# Patient Record
Sex: MALE | URBAN - METROPOLITAN AREA
[De-identification: names, ages, dates, MRNs, and addresses within clinical notes are randomized per-mention and may not be internally consistent; named-entity substitution may affect disease eponyms.]

---

## 2021-06-02 ENCOUNTER — RECORDS - HEALTHEAST (OUTPATIENT)
Dept: ADMINISTRATIVE | Facility: CLINIC | Age: 46
End: 2021-06-02

## 2021-10-24 ENCOUNTER — APPOINTMENT (OUTPATIENT)
Dept: CT IMAGING | Facility: CLINIC | Age: 46
End: 2021-10-24
Payer: COMMERCIAL

## 2021-10-24 ENCOUNTER — HOSPITAL ENCOUNTER (EMERGENCY)
Facility: CLINIC | Age: 46
Discharge: HOME | End: 2021-10-24
Admitting: PHYSICIAN ASSISTANT
Payer: COMMERCIAL

## 2021-10-24 ENCOUNTER — APPOINTMENT (OUTPATIENT)
Dept: ULTRASOUND IMAGING | Facility: CLINIC | Age: 46
End: 2021-10-24
Payer: COMMERCIAL

## 2021-10-24 VITALS
DIASTOLIC BLOOD PRESSURE: 90 MMHG | HEIGHT: 70 IN | OXYGEN SATURATION: 100 % | TEMPERATURE: 96.9 F | SYSTOLIC BLOOD PRESSURE: 174 MMHG | WEIGHT: 165 LBS | HEART RATE: 68 BPM | RESPIRATION RATE: 24 BRPM | BODY MASS INDEX: 23.62 KG/M2

## 2021-10-24 DIAGNOSIS — R11.2 NON-INTRACTABLE VOMITING WITH NAUSEA, UNSPECIFIED VOMITING TYPE: ICD-10-CM

## 2021-10-24 DIAGNOSIS — R59.0 INTRA-ABDOMINAL LYMPHADENOPATHY: ICD-10-CM

## 2021-10-24 DIAGNOSIS — R10.84 ABDOMINAL PAIN, GENERALIZED: ICD-10-CM

## 2021-10-24 DIAGNOSIS — M54.6 ACUTE BILATERAL THORACIC BACK PAIN: ICD-10-CM

## 2021-10-24 LAB
ALBUMIN SERPL-MCNC: 4.1 G/DL (ref 3.5–5)
ALBUMIN UR-MCNC: NEGATIVE MG/DL
ALP SERPL-CCNC: 42 U/L (ref 45–120)
ALT SERPL W P-5'-P-CCNC: 18 U/L (ref 0–45)
ANION GAP SERPL CALCULATED.3IONS-SCNC: 15 MMOL/L (ref 5–18)
APPEARANCE UR: CLEAR
AST SERPL W P-5'-P-CCNC: 17 U/L (ref 0–40)
ATRIAL RATE - MUSE: 55 BPM
BASOPHILS # BLD AUTO: 0.1 10E3/UL (ref 0–0.2)
BASOPHILS NFR BLD AUTO: 1 %
BILIRUB SERPL-MCNC: 1.8 MG/DL (ref 0–1)
BILIRUB UR QL STRIP: NEGATIVE
BUN SERPL-MCNC: 22 MG/DL (ref 8–22)
CALCIUM SERPL-MCNC: 8.8 MG/DL (ref 8.5–10.5)
CHLORIDE BLD-SCNC: 102 MMOL/L (ref 98–107)
CO2 SERPL-SCNC: 18 MMOL/L (ref 22–31)
COLOR UR AUTO: COLORLESS
CREAT SERPL-MCNC: 0.86 MG/DL (ref 0.7–1.3)
DIASTOLIC BLOOD PRESSURE - MUSE: 87 MMHG
EOSINOPHIL # BLD AUTO: 0.1 10E3/UL (ref 0–0.7)
EOSINOPHIL NFR BLD AUTO: 1 %
ERYTHROCYTE [DISTWIDTH] IN BLOOD BY AUTOMATED COUNT: 12.6 % (ref 10–15)
GFR SERPL CREATININE-BSD FRML MDRD: >90 ML/MIN/1.73M2
GLUCOSE BLD-MCNC: 105 MG/DL (ref 70–125)
GLUCOSE UR STRIP-MCNC: NEGATIVE MG/DL
HCT VFR BLD AUTO: 41.7 % (ref 40–53)
HGB BLD-MCNC: 14.6 G/DL (ref 13.3–17.7)
HGB UR QL STRIP: NEGATIVE
IMM GRANULOCYTES # BLD: 0.1 10E3/UL
IMM GRANULOCYTES NFR BLD: 1 %
INTERPRETATION ECG - MUSE: NORMAL
KETONES UR STRIP-MCNC: 40 MG/DL
LACTATE SERPL-SCNC: 1.1 MMOL/L (ref 0.7–2)
LACTATE SERPL-SCNC: 3.5 MMOL/L (ref 0.7–2)
LEUKOCYTE ESTERASE UR QL STRIP: NEGATIVE
LIPASE SERPL-CCNC: 24 U/L (ref 0–52)
LYMPHOCYTES # BLD AUTO: 2.3 10E3/UL (ref 0.8–5.3)
LYMPHOCYTES NFR BLD AUTO: 19 %
MCH RBC QN AUTO: 31.6 PG (ref 26.5–33)
MCHC RBC AUTO-ENTMCNC: 35 G/DL (ref 31.5–36.5)
MCV RBC AUTO: 90 FL (ref 78–100)
MONOCYTES # BLD AUTO: 0.9 10E3/UL (ref 0–1.3)
MONOCYTES NFR BLD AUTO: 7 %
NEUTROPHILS # BLD AUTO: 9.1 10E3/UL (ref 1.6–8.3)
NEUTROPHILS NFR BLD AUTO: 71 %
NITRATE UR QL: NEGATIVE
NRBC # BLD AUTO: 0 10E3/UL
NRBC BLD AUTO-RTO: 0 /100
P AXIS - MUSE: 32 DEGREES
PH UR STRIP: 6 [PH] (ref 5–7)
PLATELET # BLD AUTO: 317 10E3/UL (ref 150–450)
POTASSIUM BLD-SCNC: 3.4 MMOL/L (ref 3.5–5)
PR INTERVAL - MUSE: 156 MS
PROT SERPL-MCNC: 6.7 G/DL (ref 6–8)
QRS DURATION - MUSE: 110 MS
QT - MUSE: 498 MS
QTC - MUSE: 476 MS
R AXIS - MUSE: -48 DEGREES
RBC # BLD AUTO: 4.62 10E6/UL (ref 4.4–5.9)
RBC URINE: 2 /HPF
SODIUM SERPL-SCNC: 135 MMOL/L (ref 136–145)
SP GR UR STRIP: >1.05 (ref 1–1.03)
SYSTOLIC BLOOD PRESSURE - MUSE: 158 MMHG
T AXIS - MUSE: 0 DEGREES
TROPONIN I SERPL-MCNC: <0.01 NG/ML (ref 0–0.29)
UROBILINOGEN UR STRIP-MCNC: <2 MG/DL
VENTRICULAR RATE- MUSE: 55 BPM
WBC # BLD AUTO: 12.5 10E3/UL (ref 4–11)
WBC URINE: <1 /HPF

## 2021-10-24 PROCEDURE — 250N000011 HC RX IP 250 OP 636: Performed by: PHYSICIAN ASSISTANT

## 2021-10-24 PROCEDURE — 96374 THER/PROPH/DIAG INJ IV PUSH: CPT | Mod: 59

## 2021-10-24 PROCEDURE — 99285 EMERGENCY DEPT VISIT HI MDM: CPT | Mod: 25

## 2021-10-24 PROCEDURE — 80053 COMPREHEN METABOLIC PANEL: CPT | Performed by: PHYSICIAN ASSISTANT

## 2021-10-24 PROCEDURE — 250N000011 HC RX IP 250 OP 636: Performed by: RADIOLOGY

## 2021-10-24 PROCEDURE — 84484 ASSAY OF TROPONIN QUANT: CPT | Performed by: PHYSICIAN ASSISTANT

## 2021-10-24 PROCEDURE — 81001 URINALYSIS AUTO W/SCOPE: CPT | Performed by: PHYSICIAN ASSISTANT

## 2021-10-24 PROCEDURE — 83605 ASSAY OF LACTIC ACID: CPT | Performed by: PHYSICIAN ASSISTANT

## 2021-10-24 PROCEDURE — 96361 HYDRATE IV INFUSION ADD-ON: CPT

## 2021-10-24 PROCEDURE — 36415 COLL VENOUS BLD VENIPUNCTURE: CPT | Performed by: PHYSICIAN ASSISTANT

## 2021-10-24 PROCEDURE — 83690 ASSAY OF LIPASE: CPT | Performed by: PHYSICIAN ASSISTANT

## 2021-10-24 PROCEDURE — 258N000003 HC RX IP 258 OP 636: Performed by: PHYSICIAN ASSISTANT

## 2021-10-24 PROCEDURE — 85025 COMPLETE CBC W/AUTO DIFF WBC: CPT | Performed by: PHYSICIAN ASSISTANT

## 2021-10-24 PROCEDURE — 74177 CT ABD & PELVIS W/CONTRAST: CPT

## 2021-10-24 PROCEDURE — 76705 ECHO EXAM OF ABDOMEN: CPT

## 2021-10-24 PROCEDURE — 93005 ELECTROCARDIOGRAM TRACING: CPT | Performed by: PHYSICIAN ASSISTANT

## 2021-10-24 PROCEDURE — 96375 TX/PRO/DX INJ NEW DRUG ADDON: CPT

## 2021-10-24 RX ORDER — MORPHINE SULFATE 4 MG/ML
4 INJECTION, SOLUTION INTRAMUSCULAR; INTRAVENOUS ONCE
Status: COMPLETED | OUTPATIENT
Start: 2021-10-24 | End: 2021-10-24

## 2021-10-24 RX ORDER — CYCLOBENZAPRINE HCL 10 MG
10 TABLET ORAL 2 TIMES DAILY PRN
Qty: 14 TABLET | Refills: 0 | Status: SHIPPED | OUTPATIENT
Start: 2021-10-24

## 2021-10-24 RX ORDER — IOPAMIDOL 755 MG/ML
100 INJECTION, SOLUTION INTRAVASCULAR ONCE
Status: COMPLETED | OUTPATIENT
Start: 2021-10-24 | End: 2021-10-24

## 2021-10-24 RX ORDER — ONDANSETRON 4 MG/1
4 TABLET, ORALLY DISINTEGRATING ORAL EVERY 8 HOURS PRN
Qty: 10 TABLET | Refills: 0 | Status: SHIPPED | OUTPATIENT
Start: 2021-10-24 | End: 2021-10-24

## 2021-10-24 RX ORDER — ONDANSETRON 2 MG/ML
4 INJECTION INTRAMUSCULAR; INTRAVENOUS ONCE
Status: COMPLETED | OUTPATIENT
Start: 2021-10-24 | End: 2021-10-24

## 2021-10-24 RX ORDER — ONDANSETRON 4 MG/1
4 TABLET, ORALLY DISINTEGRATING ORAL EVERY 8 HOURS PRN
Qty: 10 TABLET | Refills: 0 | Status: SHIPPED | OUTPATIENT
Start: 2021-10-24 | End: 2024-03-21

## 2021-10-24 RX ORDER — CYCLOBENZAPRINE HCL 10 MG
10 TABLET ORAL 2 TIMES DAILY PRN
Qty: 14 TABLET | Refills: 0 | Status: SHIPPED | OUTPATIENT
Start: 2021-10-24 | End: 2021-10-24

## 2021-10-24 RX ADMIN — ONDANSETRON 4 MG: 2 INJECTION INTRAMUSCULAR; INTRAVENOUS at 17:56

## 2021-10-24 RX ADMIN — HYDROMORPHONE HYDROCHLORIDE 1 MG: 1 INJECTION, SOLUTION INTRAMUSCULAR; INTRAVENOUS; SUBCUTANEOUS at 18:35

## 2021-10-24 RX ADMIN — MORPHINE SULFATE 4 MG: 4 INJECTION INTRAVENOUS at 17:56

## 2021-10-24 RX ADMIN — IOPAMIDOL 100 ML: 755 INJECTION, SOLUTION INTRAVENOUS at 18:50

## 2021-10-24 RX ADMIN — SODIUM CHLORIDE 1000 ML: 9 INJECTION, SOLUTION INTRAVENOUS at 17:56

## 2021-10-24 RX ADMIN — SODIUM CHLORIDE 1000 ML: 9 INJECTION, SOLUTION INTRAVENOUS at 19:45

## 2021-10-24 ASSESSMENT — ENCOUNTER SYMPTOMS
BACK PAIN: 1
NERVOUS/ANXIOUS: 1
NAUSEA: 1
DIAPHORESIS: 1
ABDOMINAL PAIN: 1
DIARRHEA: 0
FEVER: 0
VOMITING: 1
SHORTNESS OF BREATH: 1

## 2021-10-24 ASSESSMENT — MIFFLIN-ST. JEOR: SCORE: 1629.69

## 2021-10-24 NOTE — ED PROVIDER NOTES
EMERGENCY DEPARTMENT ENCOUNTER      NAME: Luiz Herbert  AGE: 46 year old male  YOB: 1975  MRN: [id number]  EVALUATION DATE & TIME: 10/24/2021  5:44 PM    PCP: No primary care provider on file.    ED PROVIDER: Stefanie Hernandez PA-C      Chief Complaint   Patient presents with     Back Pain         FINAL IMPRESSION:  1. Acute bilateral thoracic back pain    2. Non-intractable vomiting with nausea, unspecified vomiting type    3. Abdominal pain, generalized    4. Intra-abdominal lymphadenopathy          MEDICAL DECISION MAKING:    Pertinent Labs & Imaging studies reviewed. (See chart for details)  46 year old male with a pertinent history of HLD, anemia, anxiety, lower back pain, heartburn presents to the Emergency Department for evaluation of sudden onset of mid back pain which began around 0930 this morning. Atraumatic. Shortly after, he developed nausea with several episodes of vomiting and dry heaving. Reports onset of abdominal pain after emesis. Denies chest pain, fevers, hematemesis, urinary symptoms.     Vitals reviewed and notable for elevated blood pressure. On physical exam he is uncomfortable appearing, diaphoretic and holding emesis bag. He has diffuse abdominal tenderness with no focality. No rebound or guarding. No flank or CVA tenderness. Lungs CTAB. Normal S1/S2. Symmetric peripheral pulses. No reproducible back tenderness. No midline spinal tenderness. 5/5 strength of bilateral lower extremities. Normal sensation. No bowel/bladder dysfunction reported. No saddle anesthesia. Differential diagnosis includes but not limited to atypical presentation for ACS, AAA dissection, mesenteric ischemia, colitis/diverticulitis, appendicitis, pyelonephritis, ureterolithiasis, pancreatitis, hepatobiliary etiology, PUD, gastritis, referred back pain.      Patient feeling significantly better after zofran and two doses of IV pain medication. CT abd pelvis with no significant findings to explain the  patient's pain. Single 10 mm right retroperitoneal lymph node. Most likely reactive. Recommend follow-up CT in 3 months to ensure that this resolves. Right testicle noted to be in the lower inguinal canal. Transient finding as on exam after he returned from imaging, both testicles present in scrotum, normal  exam. He denies any testicular pain. Lactic acid minimally elevated at 3.5 which normalized after IVF. Nothing on CT to suggest mesenteric ischemia and patient is now comfortable, denying abdominal tenderness. White count slightly elevated at 12.5. Suspect both are secondary to stress reaction from pain and vomiting. UA without evidence of infection and no RBCs to suggest recently passed kidney stone. No CT findings of kidney stone or hydronephrosis. Total bilirubin 1.8, otherwise remaining LFTs are WNL. RUQ US normal. EKG without ischemic changes and troponin not elevated, low suspicion for atypical ACS. Low risk heart score. Unclear etiology for his symptoms. Possible food poisoning/gastritis. He has no reproducible back tenderness. Clinically, nothing to suggest spinal cord compromise, epidural bleed or abscess, osteomyelitis, discitis, fracture or dislocation. He is tolerating PO. Repeat abdominal exam benign with very low suspicion for acute or surgical abdomen. Patient feels comfortable discharging home. Will send home with prescription for flexeril for his back pain and zofran for nausea/vomiting. Discussed return precautions, close follow up with PCP and patient discharged home in stable condition.     ED COURSE:  5:52 PM I met with the patient, obtained history, performed an initial exam, and discussed options and plan for diagnostics and treatment here in the ED.  7:43 PM  exam completed with chaperone after radiology reported his right testicle ascended into the inguinal canal during the CT.   10:03 PM I rechecked the patient and updated him with results. Patient discharged after being provided  with extensive anticipatory guidance and given return precautions, importance of PCP follow-up emphasized.    At the conclusion of the encounter I discussed the results of all of the tests and the disposition. The questions were answered. The patient acknowledged understanding and was agreeable with the care plan.     PPE worn: surgical mask.    MEDICATIONS GIVEN IN THE EMERGENCY:  Medications   morphine (PF) injection 4 mg (4 mg Intravenous Given 10/24/21 1756)   ondansetron (ZOFRAN) injection 4 mg (4 mg Intravenous Given 10/24/21 1756)   0.9% sodium chloride BOLUS (0 mLs Intravenous Stopped 10/24/21 1800)   HYDROmorphone (DILAUDID) injection 1 mg (1 mg Intravenous Given 10/24/21 1835)   iopamidol (ISOVUE-370) solution 100 mL (100 mLs Intravenous Given 10/24/21 1850)   0.9% sodium chloride BOLUS (0 mLs Intravenous Stopped 10/24/21 2215)       NEW PRESCRIPTIONS STARTED AT TODAY'S ER VISIT  Discharge Medication List as of 10/24/2021 10:15 PM      START taking these medications    Details   cyclobenzaprine (FLEXERIL) 10 MG tablet Take 1 tablet (10 mg) by mouth 2 times daily as needed for muscle spasms, Disp-14 tablet, R-0, E-Prescribe      ondansetron (ZOFRAN-ODT) 4 MG ODT tab Take 1 tablet (4 mg) by mouth every 8 hours as needed for nausea, Disp-10 tablet, R-0, E-Prescribe                  =================================================================    HPI:    Patient information was obtained from: patient    Use of Interpretor: N/A       Luiz Herbert is a 46 year old male with a pertinent history of HLD, anemia, anxiety, lower back pain, heartburn who presents to this ED via walk-in for evaluation of back pain, abdominal pain, and nausea with vomiting.    Patient reports sudden onset of mid back pain which began around 0930 this morning. Atraumatic. Shortly after, he developed nausea with several episodes of vomiting and dry heaving. Reports onset of abdominal pain after emesis. His pain is severe and  constant. No radiation into his chest. Associating diaphoresis, shortness of breath, and anxiety, secondary to pain. Patient denies diarrhea, fevers, urinary symptoms, hematemesis. No history of abdominal surgeries or kidney stones. Only prescriptions medication he is on is lexapro. No other complaints or concerns expressed at this time.    REVIEW OF SYSTEMS:  Review of Systems   Constitutional: Positive for diaphoresis. Negative for fever.   Respiratory: Positive for shortness of breath. Negative for cough.    Cardiovascular: Negative for chest pain and palpitations.   Gastrointestinal: Positive for abdominal pain, nausea and vomiting. Negative for blood in stool and diarrhea.   Genitourinary: Negative for dysuria, flank pain, frequency, hematuria and testicular pain.   Musculoskeletal: Positive for back pain. Negative for gait problem.   Neurological: Negative for dizziness, light-headedness and headaches.   Psychiatric/Behavioral: The patient is nervous/anxious.    All other systems reviewed and are negative.    PAST MEDICAL HISTORY:  Past Medical History:   Diagnosis Date     Anemia      Anxiety      Depressive disorder      Essential familial hypercholesterolemia        PAST SURGICAL HISTORY:  History reviewed. No pertinent surgical history.        CURRENT MEDICATIONS:    No current facility-administered medications for this encounter.    Current Outpatient Medications:      cyclobenzaprine (FLEXERIL) 10 MG tablet, Take 1 tablet (10 mg) by mouth 2 times daily as needed for muscle spasms, Disp: 14 tablet, Rfl: 0     ondansetron (ZOFRAN-ODT) 4 MG ODT tab, Take 1 tablet (4 mg) by mouth every 8 hours as needed for nausea, Disp: 10 tablet, Rfl: 0     escitalopram oxalate (LEXAPRO) 10 MG tablet, [ESCITALOPRAM OXALATE (LEXAPRO) 10 MG TABLET] TAKE 1 TABLET BY MOUTH DAILY, Disp: 90 tablet, Rfl: 0      ALLERGIES:  No Known Allergies    FAMILY HISTORY:  History reviewed. No pertinent family history.    SOCIAL HISTORY:  "  Social History     Socioeconomic History     Marital status:      Spouse name: Not on file     Number of children: Not on file     Years of education: Not on file     Highest education level: Not on file   Occupational History     Not on file   Tobacco Use     Smoking status: Not on file   Substance and Sexual Activity     Alcohol use: Not on file     Drug use: Not on file     Sexual activity: Not on file   Other Topics Concern     Not on file   Social History Narrative     Not on file     Social Determinants of Health     Financial Resource Strain:      Difficulty of Paying Living Expenses:    Food Insecurity:      Worried About Running Out of Food in the Last Year:      Ran Out of Food in the Last Year:    Transportation Needs:      Lack of Transportation (Medical):      Lack of Transportation (Non-Medical):    Physical Activity:      Days of Exercise per Week:      Minutes of Exercise per Session:    Stress:      Feeling of Stress :    Social Connections:      Frequency of Communication with Friends and Family:      Frequency of Social Gatherings with Friends and Family:      Attends Orthodoxy Services:      Active Member of Clubs or Organizations:      Attends Club or Organization Meetings:      Marital Status:    Intimate Partner Violence:      Fear of Current or Ex-Partner:      Emotionally Abused:      Physically Abused:      Sexually Abused:        VITALS:  Patient Vitals for the past 24 hrs:   BP Temp Temp src Pulse Resp SpO2 Height Weight   10/24/21 2000 -- -- -- 68 -- 100 % -- --   10/24/21 1945 (!) 174/90 -- -- 69 -- 100 % -- --   10/24/21 1930 (!) 172/95 -- -- (!) 124 -- 98 % -- --   10/24/21 1915 (!) 167/84 96.9  F (36.1  C) Temporal 53 -- 100 % -- --   10/24/21 1900 (!) 142/89 -- -- 59 -- 99 % -- --   10/24/21 1747 (!) 158/88 -- -- 61 24 99 % 1.778 m (5' 10\") 74.8 kg (165 lb)       PHYSICAL EXAM    Constitutional: Well developed, Well nourished, uncomfortable appearing.  HENT: Normocephalic, " Atraumatic, Bilateral external ears normal, Oropharynx normal, mucous membranes moist, Nose normal.   Neck: Normal range of motion, No tenderness, Supple, No stridor.   Eyes: Conjunctiva normal, No discharge.   Respiratory: Normal breath sounds, No respiratory distress, No wheezing, Speaks full sentences easily. No cough.   Cardiovascular: Normal heart rate, Regular rhythm, No murmurs, No rubs, No gallops. Chest wall nontender. Symmetric peripheral pulses.  GI: Soft, diffuse abdominal tenderness, No masses, No flank or CVA tenderness. No rebound or guarding.    : Chaperoned by EDT. Circumcised penis. No scrotal edema or erythema, both testicles are in the scrotum, no inguinal tenderness.   Musculoskeletal: 2+ DP pulses. No edema. No cyanosis, No clubbing. Good range of motion in all major joints. No tenderness to palpation or major deformities noted. No tenderness of the CTLS spine.   Integument: Warm, Diaphoretic, No erythema, No rash. No petechiae.  Neurologic: Alert & oriented x 3, Normal motor function, Normal sensory function, No focal deficits noted. Normal gait.   Psychiatric: Affect normal, Judgment normal, Mood normal. Cooperative.    LAB:  All pertinent labs reviewed and interpreted.  Recent Results (from the past 24 hour(s))   Lactic acid whole blood    Collection Time: 10/24/21  6:04 PM   Result Value Ref Range    Lactic Acid 3.5 (H) 0.7 - 2.0 mmol/L   Troponin I (now)    Collection Time: 10/24/21  6:04 PM   Result Value Ref Range    Troponin I <0.01 0.00 - 0.29 ng/mL   CBC with platelets and differential    Collection Time: 10/24/21  6:04 PM   Result Value Ref Range    WBC Count 12.5 (H) 4.0 - 11.0 10e3/uL    RBC Count 4.62 4.40 - 5.90 10e6/uL    Hemoglobin 14.6 13.3 - 17.7 g/dL    Hematocrit 41.7 40.0 - 53.0 %    MCV 90 78 - 100 fL    MCH 31.6 26.5 - 33.0 pg    MCHC 35.0 31.5 - 36.5 g/dL    RDW 12.6 10.0 - 15.0 %    Platelet Count 317 150 - 450 10e3/uL    % Neutrophils 71 %    % Lymphocytes 19 %    %  Monocytes 7 %    % Eosinophils 1 %    % Basophils 1 %    % Immature Granulocytes 1 %    NRBCs per 100 WBC 0 <1 /100    Absolute Neutrophils 9.1 (H) 1.6 - 8.3 10e3/uL    Absolute Lymphocytes 2.3 0.8 - 5.3 10e3/uL    Absolute Monocytes 0.9 0.0 - 1.3 10e3/uL    Absolute Eosinophils 0.1 0.0 - 0.7 10e3/uL    Absolute Basophils 0.1 0.0 - 0.2 10e3/uL    Absolute Immature Granulocytes 0.1 (H) <=0.0 10e3/uL    Absolute NRBCs 0.0 10e3/uL   ECG 12-LEAD WITH MUSE (LHE)    Collection Time: 10/24/21  6:12 PM   Result Value Ref Range    Systolic Blood Pressure 158 mmHg    Diastolic Blood Pressure 87 mmHg    Ventricular Rate 55 BPM    Atrial Rate 55 BPM    OH Interval 156 ms    QRS Duration 110 ms     ms    QTc 476 ms    P Axis 32 degrees    R AXIS -48 degrees    T Axis 0 degrees    Interpretation ECG       Sinus bradycardia with sinus arrhythmia  Incomplete right bundle branch block  Left anterior fascicular block  Abnormal ECG  No previous ECGs available  Confirmed by SEE ED PROVIDER NOTE FOR, ECG INTERPRETATION ([id number]),  DREW PIZARRO ([id number]) on 10/24/2021 6:36:08 PM     Comprehensive metabolic panel    Collection Time: 10/24/21  6:52 PM   Result Value Ref Range    Sodium 135 (L) 136 - 145 mmol/L    Potassium 3.4 (L) 3.5 - 5.0 mmol/L    Chloride 102 98 - 107 mmol/L    Carbon Dioxide (CO2) 18 (L) 22 - 31 mmol/L    Anion Gap 15 5 - 18 mmol/L    Urea Nitrogen 22 8 - 22 mg/dL    Creatinine 0.86 0.70 - 1.30 mg/dL    Calcium 8.8 8.5 - 10.5 mg/dL    Glucose 105 70 - 125 mg/dL    Alkaline Phosphatase 42 (L) 45 - 120 U/L    AST 17 0 - 40 U/L    ALT 18 0 - 45 U/L    Protein Total 6.7 6.0 - 8.0 g/dL    Albumin 4.1 3.5 - 5.0 g/dL    Bilirubin Total 1.8 (H) 0.0 - 1.0 mg/dL    GFR Estimate >90 >60 mL/min/1.73m2   Lipase    Collection Time: 10/24/21  6:52 PM   Result Value Ref Range    Lipase 24 0 - 52 U/L   UA with Microscopic reflex to Culture    Collection Time: 10/24/21  9:16 PM    Specimen: Urine, Midstream   Result Value Ref  Range    Color Urine Colorless Colorless, Straw, Light Yellow, Yellow    Appearance Urine Clear Clear    Glucose Urine Negative Negative mg/dL    Bilirubin Urine Negative Negative    Ketones Urine 40  (A) Negative mg/dL    Specific Gravity Urine >1.050 (H) 1.001 - 1.030    Blood Urine Negative Negative    pH Urine 6.0 5.0 - 7.0    Protein Albumin Urine Negative Negative mg/dL    Urobilinogen Urine <2.0 <2.0 mg/dL    Nitrite Urine Negative Negative    Leukocyte Esterase Urine Negative Negative    RBC Urine 2 <=2 /HPF    WBC Urine <1 <=5 /HPF   Lactic acid whole blood    Collection Time: 10/24/21  9:57 PM   Result Value Ref Range    Lactic Acid 1.1 0.7 - 2.0 mmol/L         RADIOLOGY:  Reviewed all pertinent imaging. Please see official radiology report.  Abdomen US, limited (RUQ only)   Final Result   IMPRESSION:   1.  Normal limited abdominal ultrasound.            CT Abdomen Pelvis w Contrast   Final Result   IMPRESSION:    1.  No significant findings to explain the patient's pain.      2.  Single 10 mm mildly prominent right retroperitoneal lymph node. Most likely reactive. Recommend follow-up CT in 3 months to ensure that this resolves.      3.  Right testicle in the lower inguinal canal. This may be a transient finding. Recommend correlating clinically.        EKG:      Performed at: 18:12    Impression: Sinus bradycardia with sinus arrhymia, incomplete RBBB, left anterior fascicular block, probable left ventricular hypertrophy.    Rate: 55 bpm  Rhythm: Sinus  Axis: -48  OK Interval: 156 ms  QRS Interval: 110 ms  QTc Interval: 476 ms  ST Changes: No acute ST elevations or depressions.  Comparison: No previous available    I have independently reviewed and interpreted the EKG(s) documented above.  Reviewed with Dr. Gould.      Jacinto YANEZ, am serving as a scribe to document services personally performed by Stefanie Hernandez PA-C based on my observation and the provider's statements to me. Stefanie YANEZ PA-C  attest that Jacinto Roberts is acting in a scribe capacity, has observed my performance of the services and has documented them in accordance with my direction.    Stefanie Hernandez PA-C  Emergency Medicine  [facility]  10/24/2021     Stefanie Hernandez PA-C  10/28/21 1102

## 2021-10-25 NOTE — DISCHARGE INSTRUCTIONS
Unclear on exact etiology for your symptoms. Work up is overall quite reassuring. Incidental finding of an enlarged lymph node in your abdomen. Recommend interval follow up with your primary care provider in about 3 months to ensure this resolves. I will send referral to spine center for your ongoing back pain. You can try the prescribed flexeril. Take medications as prescribed.  Be aware they can make you sleepy or drowsy so do not drive while taking, do other dangerous activities, or mix with other sedatives or alcohol.    Zofran for nausea. Return with new/worsening symptoms like we discussed including fevers, persistent vomiting, diarrhea, abdominal pain returns, bowel/bladder dysfunction, numbness or weakness in legs.

## 2021-10-28 ASSESSMENT — ENCOUNTER SYMPTOMS
FREQUENCY: 0
LIGHT-HEADEDNESS: 0
FLANK PAIN: 0
HEMATURIA: 0
DYSURIA: 0
COUGH: 0
DIZZINESS: 0
BLOOD IN STOOL: 0
HEADACHES: 0
PALPITATIONS: 0

## 2022-02-12 ENCOUNTER — APPOINTMENT (OUTPATIENT)
Dept: CT IMAGING | Facility: CLINIC | Age: 47
End: 2022-02-12
Attending: EMERGENCY MEDICINE
Payer: COMMERCIAL

## 2022-02-12 ENCOUNTER — HOSPITAL ENCOUNTER (EMERGENCY)
Facility: CLINIC | Age: 47
Discharge: HOME | End: 2022-02-12
Attending: EMERGENCY MEDICINE | Admitting: EMERGENCY MEDICINE
Payer: COMMERCIAL

## 2022-02-12 VITALS
OXYGEN SATURATION: 95 % | HEART RATE: 72 BPM | WEIGHT: 180 LBS | TEMPERATURE: 97.7 F | SYSTOLIC BLOOD PRESSURE: 142 MMHG | DIASTOLIC BLOOD PRESSURE: 84 MMHG | BODY MASS INDEX: 25.77 KG/M2 | HEIGHT: 70 IN | RESPIRATION RATE: 22 BRPM

## 2022-02-12 DIAGNOSIS — K52.9 COLITIS: ICD-10-CM

## 2022-02-12 LAB
ALBUMIN SERPL-MCNC: 4.6 G/DL (ref 3.5–5)
ALP SERPL-CCNC: 66 U/L (ref 45–120)
ALT SERPL W P-5'-P-CCNC: 18 U/L (ref 0–45)
ANION GAP SERPL CALCULATED.3IONS-SCNC: 15 MMOL/L (ref 5–18)
AST SERPL W P-5'-P-CCNC: 18 U/L (ref 0–40)
BASOPHILS # BLD AUTO: 0 10E3/UL (ref 0–0.2)
BASOPHILS NFR BLD AUTO: 0 %
BILIRUB SERPL-MCNC: 1.5 MG/DL (ref 0–1)
BUN SERPL-MCNC: 22 MG/DL (ref 8–22)
CALCIUM SERPL-MCNC: 9.9 MG/DL (ref 8.5–10.5)
CHLORIDE BLD-SCNC: 99 MMOL/L (ref 98–107)
CO2 SERPL-SCNC: 19 MMOL/L (ref 22–31)
CREAT SERPL-MCNC: 1.07 MG/DL (ref 0.7–1.3)
EOSINOPHIL # BLD AUTO: 0 10E3/UL (ref 0–0.7)
EOSINOPHIL NFR BLD AUTO: 0 %
ERYTHROCYTE [DISTWIDTH] IN BLOOD BY AUTOMATED COUNT: 12.7 % (ref 10–15)
GFR SERPL CREATININE-BSD FRML MDRD: 86 ML/MIN/1.73M2
GLUCOSE BLD-MCNC: 181 MG/DL (ref 70–125)
HCT VFR BLD AUTO: 42.3 % (ref 40–53)
HGB BLD-MCNC: 14.9 G/DL (ref 13.3–17.7)
IMM GRANULOCYTES # BLD: 0.1 10E3/UL
IMM GRANULOCYTES NFR BLD: 0 %
LACTATE SERPL-SCNC: 1.4 MMOL/L (ref 0.7–2)
LACTATE SERPL-SCNC: 3.5 MMOL/L (ref 0.7–2)
LIPASE SERPL-CCNC: 19 U/L (ref 0–52)
LYMPHOCYTES # BLD AUTO: 0.8 10E3/UL (ref 0.8–5.3)
LYMPHOCYTES NFR BLD AUTO: 5 %
MCH RBC QN AUTO: 31.4 PG (ref 26.5–33)
MCHC RBC AUTO-ENTMCNC: 35.2 G/DL (ref 31.5–36.5)
MCV RBC AUTO: 89 FL (ref 78–100)
MONOCYTES # BLD AUTO: 0.5 10E3/UL (ref 0–1.3)
MONOCYTES NFR BLD AUTO: 3 %
NEUTROPHILS # BLD AUTO: 13.5 10E3/UL (ref 1.6–8.3)
NEUTROPHILS NFR BLD AUTO: 92 %
NRBC # BLD AUTO: 0 10E3/UL
NRBC BLD AUTO-RTO: 0 /100
PLATELET # BLD AUTO: 330 10E3/UL (ref 150–450)
POTASSIUM BLD-SCNC: 3.8 MMOL/L (ref 3.5–5)
PROT SERPL-MCNC: 8.4 G/DL (ref 6–8)
RBC # BLD AUTO: 4.75 10E6/UL (ref 4.4–5.9)
SODIUM SERPL-SCNC: 133 MMOL/L (ref 136–145)
WBC # BLD AUTO: 14.8 10E3/UL (ref 4–11)

## 2022-02-12 PROCEDURE — 96376 TX/PRO/DX INJ SAME DRUG ADON: CPT

## 2022-02-12 PROCEDURE — 96374 THER/PROPH/DIAG INJ IV PUSH: CPT

## 2022-02-12 PROCEDURE — 99285 EMERGENCY DEPT VISIT HI MDM: CPT | Mod: 25

## 2022-02-12 PROCEDURE — 83605 ASSAY OF LACTIC ACID: CPT | Performed by: EMERGENCY MEDICINE

## 2022-02-12 PROCEDURE — 96361 HYDRATE IV INFUSION ADD-ON: CPT

## 2022-02-12 PROCEDURE — 250N000011 HC RX IP 250 OP 636: Performed by: EMERGENCY MEDICINE

## 2022-02-12 PROCEDURE — 80053 COMPREHEN METABOLIC PANEL: CPT | Performed by: EMERGENCY MEDICINE

## 2022-02-12 PROCEDURE — 96375 TX/PRO/DX INJ NEW DRUG ADDON: CPT

## 2022-02-12 PROCEDURE — 85025 COMPLETE CBC W/AUTO DIFF WBC: CPT | Performed by: EMERGENCY MEDICINE

## 2022-02-12 PROCEDURE — 74177 CT ABD & PELVIS W/CONTRAST: CPT

## 2022-02-12 PROCEDURE — 83690 ASSAY OF LIPASE: CPT | Performed by: EMERGENCY MEDICINE

## 2022-02-12 PROCEDURE — 82040 ASSAY OF SERUM ALBUMIN: CPT | Performed by: EMERGENCY MEDICINE

## 2022-02-12 PROCEDURE — 258N000003 HC RX IP 258 OP 636: Performed by: EMERGENCY MEDICINE

## 2022-02-12 PROCEDURE — 36415 COLL VENOUS BLD VENIPUNCTURE: CPT | Performed by: EMERGENCY MEDICINE

## 2022-02-12 RX ORDER — ONDANSETRON 2 MG/ML
4 INJECTION INTRAMUSCULAR; INTRAVENOUS EVERY 30 MIN PRN
Status: DISCONTINUED | OUTPATIENT
Start: 2022-02-12 | End: 2022-02-12 | Stop reason: HOSPADM

## 2022-02-12 RX ORDER — IOPAMIDOL 755 MG/ML
100 INJECTION, SOLUTION INTRAVASCULAR ONCE
Status: COMPLETED | OUTPATIENT
Start: 2022-02-12 | End: 2022-02-12

## 2022-02-12 RX ORDER — SODIUM CHLORIDE 9 MG/ML
INJECTION, SOLUTION INTRAVENOUS CONTINUOUS
Status: DISCONTINUED | OUTPATIENT
Start: 2022-02-12 | End: 2022-02-12 | Stop reason: HOSPADM

## 2022-02-12 RX ORDER — ONDANSETRON 4 MG/1
4 TABLET, FILM COATED ORAL EVERY 8 HOURS PRN
Qty: 12 TABLET | Refills: 0 | Status: CANCELLED | OUTPATIENT
Start: 2022-02-12

## 2022-02-12 RX ORDER — HYDROMORPHONE HYDROCHLORIDE 1 MG/ML
0.5 INJECTION, SOLUTION INTRAMUSCULAR; INTRAVENOUS; SUBCUTANEOUS
Status: DISCONTINUED | OUTPATIENT
Start: 2022-02-12 | End: 2022-02-12 | Stop reason: HOSPADM

## 2022-02-12 RX ORDER — HYDROCODONE BITARTRATE AND ACETAMINOPHEN 5; 325 MG/1; MG/1
1 TABLET ORAL EVERY 6 HOURS PRN
Qty: 6 TABLET | Refills: 0 | Status: CANCELLED | OUTPATIENT
Start: 2022-02-12

## 2022-02-12 RX ADMIN — SODIUM CHLORIDE 1000 ML: 9 INJECTION, SOLUTION INTRAVENOUS at 05:15

## 2022-02-12 RX ADMIN — IOPAMIDOL 100 ML: 755 INJECTION, SOLUTION INTRAVENOUS at 05:29

## 2022-02-12 RX ADMIN — HYDROMORPHONE HYDROCHLORIDE 0.5 MG: 1 INJECTION, SOLUTION INTRAMUSCULAR; INTRAVENOUS; SUBCUTANEOUS at 06:15

## 2022-02-12 RX ADMIN — ONDANSETRON 4 MG: 2 INJECTION INTRAMUSCULAR; INTRAVENOUS at 06:15

## 2022-02-12 RX ADMIN — ONDANSETRON 4 MG: 2 INJECTION INTRAMUSCULAR; INTRAVENOUS at 05:11

## 2022-02-12 RX ADMIN — HYDROMORPHONE HYDROCHLORIDE 0.5 MG: 1 INJECTION, SOLUTION INTRAMUSCULAR; INTRAVENOUS; SUBCUTANEOUS at 05:12

## 2022-02-12 ASSESSMENT — ENCOUNTER SYMPTOMS
VOMITING: 1
DIARRHEA: 1
ABDOMINAL PAIN: 1
APPETITE CHANGE: 1

## 2022-02-12 ASSESSMENT — MIFFLIN-ST. JEOR: SCORE: 1697.72

## 2022-02-12 NOTE — ED PROVIDER NOTES
EMERGENCY DEPARTMENT ENCOUNTER       ED Course & Medical Decision Making     4:54 AM I met the patient and performed my initial interview and exam. PPE: Surgical mask and gloves       I saw and examined the patient.  IV was established she was given a fluid bolus, antiemetics and pain medication.  Diagnostics ordered.    Initial lactate is elevated at 3.5.  Mild leukocytosis at 14.8.  CT scan is consistent with colitis.    Stool cultures are pending.    I will repeat his lactic acid after IV fluids.  He is feeling much better and his abdomen is soft and benign.  Plan is to discharge the patient as long as his lactic acid comes down and he passes oral challenge    I will provide prescriptions for some pain medication, antiemetics and referral to gastroenterology        Prior to making a final disposition on this patient the results of patient's tests and other diagnostic studies were discussed with the patient. All questions were answered. Patient expressed understanding of the plan and was amenable to it.    Medications   0.9% sodium chloride BOLUS (has no administration in time range)     Followed by   sodium chloride 0.9% infusion (has no administration in time range)   ondansetron (ZOFRAN) injection 4 mg (has no administration in time range)   HYDROmorphone (PF) (DILAUDID) injection 0.5 mg (has no administration in time range)       Final Impression     No diagnosis found.        Chief Complaint     Chief Complaint   Patient presents with     Abdominal Pain     Nausea, Vomiting, & Diarrhea       Pt states unable to eat or drink anything since Friday morning. Medial epigastric abdominal pain, new to patient.       HPI       Luiz Herbert is a 46 year old male with history of hypercholesterolemia and heartburn who presents to the ED via private car for evaluation of abdominal pain.    Patient reports epigastric abdominal pain since 02/11 (1 day ago). He states he drank water yesterday and had several episodes  of vomiting afterwards. He also endorses mild diarrhea and a decrease in appetite. He has been belching. Patient reports he has not been urinating as much and that he feels dehydrated. No known sick contacts. He denies any prior history of abdominal surgeries. Patient drinks alcohol, but does not heavily. He takes daily lexapro. Denies chest pain or any other complaints at this time.    I, Vikki Guidoyasmeen am serving as a scribe to document services personally performed by Arturo Guerrero M.D. based on my observation and the provider's statements to me. IArturo M.D attest that Vikki Boggs is acting in a scribe capacity, has observed my performance of the services and has documented them in accordance with my direction.    Past Medical History     Past Medical History:   Diagnosis Date     Anemia      Anxiety      Depressive disorder      Essential familial hypercholesterolemia      History reviewed. No pertinent surgical history.  History reviewed. No pertinent family history.   Social History     Tobacco Use     Smoking status: None     Smokeless tobacco: None   Substance Use Topics     Alcohol use: None     Drug use: None       Relevant past medical, surgical, family and social history as documented above, has been reviewed and discussed with patient. No changes or additions, unless otherwise noted in the HPI.    Current Medications     cyclobenzaprine (FLEXERIL) 10 MG tablet  escitalopram oxalate (LEXAPRO) 10 MG tablet  ondansetron (ZOFRAN-ODT) 4 MG ODT tab        Allergies     No Known Allergies    Review of Systems     Review of Systems   Constitutional: Positive for appetite change.   Cardiovascular: Negative for chest pain.   Gastrointestinal: Positive for abdominal pain (epigastric), diarrhea (mild) and vomiting.   Genitourinary: Negative for decreased urine volume.   All other systems reviewed and are negative.       Remainder of systems reviewed, unless noted in HPI all others  "negative.    Physical Exam     Resp 22   Ht 1.778 m (5' 10\")   Wt 81.6 kg (180 lb)   SpO2 97%   BMI 25.83 kg/m      Physical Exam  Vitals and nursing note reviewed.   Constitutional:       General: He is not in acute distress.  HENT:      Head: Normocephalic.      Nose: Nose normal.   Eyes:      General: No scleral icterus.  Cardiovascular:      Rate and Rhythm: Normal rate.   Pulmonary:      Effort: Pulmonary effort is normal.   Abdominal:      Tenderness: There is generalized abdominal tenderness. There is no guarding or rebound.   Musculoskeletal:      Cervical back: Neck supple.   Skin:     Findings: No rash.   Neurological:      Mental Status: He is alert. Mental status is at baseline.   Psychiatric:         Mood and Affect: Mood normal.             Labs & Imaging         Labs Ordered and Resulted from Time of ED Arrival to Time of ED Departure - No data to display           Arturo Guerrero MD  Emergency Medicine  [facility] EMERGENCY ROOM  [facility] [facility] [id number]  [phone]  2/12/2022       Arturo Guerrero MD  02/12/22 0732    "

## 2022-02-12 NOTE — ED TRIAGE NOTES
Pt states unable to eat or drink anything since Friday morning. Medial epigastric abdominal pain, new to patient. Pt states 40 episodes of emesis/bile.

## 2024-01-13 ENCOUNTER — PRE VISIT (OUTPATIENT)
Dept: GASTROENTEROLOGY | Facility: CLINIC | Age: 49
End: 2024-01-13

## 2024-03-21 ENCOUNTER — HOSPITAL ENCOUNTER (EMERGENCY)
Facility: CLINIC | Age: 49
Discharge: HOME | End: 2024-03-21
Admitting: PHYSICIAN ASSISTANT
Payer: COMMERCIAL

## 2024-03-21 ENCOUNTER — APPOINTMENT (OUTPATIENT)
Dept: CT IMAGING | Facility: CLINIC | Age: 49
End: 2024-03-21
Attending: PHYSICIAN ASSISTANT
Payer: COMMERCIAL

## 2024-03-21 VITALS
HEART RATE: 73 BPM | DIASTOLIC BLOOD PRESSURE: 93 MMHG | WEIGHT: 185 LBS | TEMPERATURE: 97.3 F | RESPIRATION RATE: 16 BRPM | BODY MASS INDEX: 27.4 KG/M2 | OXYGEN SATURATION: 97 % | HEIGHT: 69 IN | SYSTOLIC BLOOD PRESSURE: 140 MMHG

## 2024-03-21 DIAGNOSIS — R10.9 ABDOMINAL PAIN: ICD-10-CM

## 2024-03-21 DIAGNOSIS — R11.2 NAUSEA AND VOMITING: ICD-10-CM

## 2024-03-21 LAB
ALBUMIN SERPL BCG-MCNC: 5 G/DL (ref 3.5–5.2)
ALP SERPL-CCNC: 74 U/L (ref 40–150)
ALT SERPL W P-5'-P-CCNC: 25 U/L (ref 0–70)
ANION GAP SERPL CALCULATED.3IONS-SCNC: 17 MMOL/L (ref 7–15)
AST SERPL W P-5'-P-CCNC: 28 U/L (ref 0–45)
BASOPHILS # BLD AUTO: 0 10E3/UL (ref 0–0.2)
BASOPHILS NFR BLD AUTO: 0 %
BILIRUB DIRECT SERPL-MCNC: 0.37 MG/DL (ref 0–0.3)
BILIRUB SERPL-MCNC: 1.6 MG/DL
BUN SERPL-MCNC: 17.4 MG/DL (ref 6–20)
CALCIUM SERPL-MCNC: 10.4 MG/DL (ref 8.6–10)
CHLORIDE SERPL-SCNC: 95 MMOL/L (ref 98–107)
CREAT SERPL-MCNC: 0.8 MG/DL (ref 0.67–1.17)
DEPRECATED HCO3 PLAS-SCNC: 23 MMOL/L (ref 22–29)
EGFRCR SERPLBLD CKD-EPI 2021: >90 ML/MIN/1.73M2
EOSINOPHIL # BLD AUTO: 0 10E3/UL (ref 0–0.7)
EOSINOPHIL NFR BLD AUTO: 0 %
ERYTHROCYTE [DISTWIDTH] IN BLOOD BY AUTOMATED COUNT: 12.3 % (ref 10–15)
GLUCOSE SERPL-MCNC: 167 MG/DL (ref 70–99)
HCT VFR BLD AUTO: 39.9 % (ref 40–53)
HGB BLD-MCNC: 14.4 G/DL (ref 13.3–17.7)
IMM GRANULOCYTES # BLD: 0 10E3/UL
IMM GRANULOCYTES NFR BLD: 0 %
LIPASE SERPL-CCNC: 32 U/L (ref 13–60)
LYMPHOCYTES # BLD AUTO: 0.7 10E3/UL (ref 0.8–5.3)
LYMPHOCYTES NFR BLD AUTO: 6 %
MCH RBC QN AUTO: 32 PG (ref 26.5–33)
MCHC RBC AUTO-ENTMCNC: 36.1 G/DL (ref 31.5–36.5)
MCV RBC AUTO: 89 FL (ref 78–100)
MONOCYTES # BLD AUTO: 0.4 10E3/UL (ref 0–1.3)
MONOCYTES NFR BLD AUTO: 4 %
NEUTROPHILS # BLD AUTO: 9.6 10E3/UL (ref 1.6–8.3)
NEUTROPHILS NFR BLD AUTO: 90 %
NRBC # BLD AUTO: 0 10E3/UL
NRBC BLD AUTO-RTO: 0 /100
PLATELET # BLD AUTO: 252 10E3/UL (ref 150–450)
POTASSIUM SERPL-SCNC: 3.6 MMOL/L (ref 3.4–5.3)
PROT SERPL-MCNC: 8 G/DL (ref 6.4–8.3)
RBC # BLD AUTO: 4.5 10E6/UL (ref 4.4–5.9)
SODIUM SERPL-SCNC: 135 MMOL/L (ref 135–145)
WBC # BLD AUTO: 10.7 10E3/UL (ref 4–11)

## 2024-03-21 PROCEDURE — 99285 EMERGENCY DEPT VISIT HI MDM: CPT | Mod: 25

## 2024-03-21 PROCEDURE — 36415 COLL VENOUS BLD VENIPUNCTURE: CPT | Performed by: PHYSICIAN ASSISTANT

## 2024-03-21 PROCEDURE — 96376 TX/PRO/DX INJ SAME DRUG ADON: CPT

## 2024-03-21 PROCEDURE — 74177 CT ABD & PELVIS W/CONTRAST: CPT

## 2024-03-21 PROCEDURE — 83690 ASSAY OF LIPASE: CPT | Performed by: PHYSICIAN ASSISTANT

## 2024-03-21 PROCEDURE — 250N000011 HC RX IP 250 OP 636: Performed by: PHYSICIAN ASSISTANT

## 2024-03-21 PROCEDURE — 80053 COMPREHEN METABOLIC PANEL: CPT | Performed by: PHYSICIAN ASSISTANT

## 2024-03-21 PROCEDURE — 96375 TX/PRO/DX INJ NEW DRUG ADDON: CPT

## 2024-03-21 PROCEDURE — 96361 HYDRATE IV INFUSION ADD-ON: CPT

## 2024-03-21 PROCEDURE — 96374 THER/PROPH/DIAG INJ IV PUSH: CPT | Mod: 59

## 2024-03-21 PROCEDURE — 85025 COMPLETE CBC W/AUTO DIFF WBC: CPT | Performed by: PHYSICIAN ASSISTANT

## 2024-03-21 PROCEDURE — 258N000003 HC RX IP 258 OP 636: Performed by: PHYSICIAN ASSISTANT

## 2024-03-21 PROCEDURE — C9113 INJ PANTOPRAZOLE SODIUM, VIA: HCPCS | Performed by: PHYSICIAN ASSISTANT

## 2024-03-21 RX ORDER — IOPAMIDOL 755 MG/ML
90 INJECTION, SOLUTION INTRAVASCULAR ONCE
Status: COMPLETED | OUTPATIENT
Start: 2024-03-21 | End: 2024-03-21

## 2024-03-21 RX ORDER — ONDANSETRON 4 MG/1
4 TABLET, ORALLY DISINTEGRATING ORAL EVERY 8 HOURS PRN
Qty: 15 TABLET | Refills: 0 | Status: SHIPPED | OUTPATIENT
Start: 2024-03-21

## 2024-03-21 RX ORDER — HYDROMORPHONE HYDROCHLORIDE 1 MG/ML
0.5 INJECTION, SOLUTION INTRAMUSCULAR; INTRAVENOUS; SUBCUTANEOUS ONCE
Status: COMPLETED | OUTPATIENT
Start: 2024-03-21 | End: 2024-03-21

## 2024-03-21 RX ORDER — ONDANSETRON 2 MG/ML
4 INJECTION INTRAMUSCULAR; INTRAVENOUS ONCE
Status: COMPLETED | OUTPATIENT
Start: 2024-03-21 | End: 2024-03-21

## 2024-03-21 RX ADMIN — PANTOPRAZOLE SODIUM 40 MG: 40 INJECTION, POWDER, FOR SOLUTION INTRAVENOUS at 19:40

## 2024-03-21 RX ADMIN — HYDROMORPHONE HYDROCHLORIDE 0.5 MG: 1 INJECTION, SOLUTION INTRAMUSCULAR; INTRAVENOUS; SUBCUTANEOUS at 18:38

## 2024-03-21 RX ADMIN — ONDANSETRON 4 MG: 2 INJECTION INTRAMUSCULAR; INTRAVENOUS at 17:42

## 2024-03-21 RX ADMIN — IOPAMIDOL 90 ML: 755 INJECTION, SOLUTION INTRAVENOUS at 18:22

## 2024-03-21 RX ADMIN — FAMOTIDINE 20 MG: 10 INJECTION, SOLUTION INTRAVENOUS at 19:40

## 2024-03-21 RX ADMIN — SODIUM CHLORIDE 1000 ML: 9 INJECTION, SOLUTION INTRAVENOUS at 17:38

## 2024-03-21 RX ADMIN — HYDROMORPHONE HYDROCHLORIDE 0.5 MG: 1 INJECTION, SOLUTION INTRAMUSCULAR; INTRAVENOUS; SUBCUTANEOUS at 19:40

## 2024-03-21 RX ADMIN — SODIUM CHLORIDE 1000 ML: 9 INJECTION, SOLUTION INTRAVENOUS at 19:40

## 2024-03-21 ASSESSMENT — COLUMBIA-SUICIDE SEVERITY RATING SCALE - C-SSRS
1. IN THE PAST MONTH, HAVE YOU WISHED YOU WERE DEAD OR WISHED YOU COULD GO TO SLEEP AND NOT WAKE UP?: NO
2. HAVE YOU ACTUALLY HAD ANY THOUGHTS OF KILLING YOURSELF IN THE PAST MONTH?: NO
6. HAVE YOU EVER DONE ANYTHING, STARTED TO DO ANYTHING, OR PREPARED TO DO ANYTHING TO END YOUR LIFE?: NO

## 2024-03-21 ASSESSMENT — ACTIVITIES OF DAILY LIVING (ADL)
ADLS_ACUITY_SCORE: 35
ADLS_ACUITY_SCORE: 35

## 2024-03-21 NOTE — ED TRIAGE NOTES
Patient presents to ED with abdominal pain and emesis that began last night, reports that he is a daily drinker, was at clinic PTA and had GI cocktail that initially helped, but the pain came back.  Brenda Tapia RN.......3/21/2024 5:05 PM     Triage Assessment (Adult)       Row Name 03/21/24 7638          Triage Assessment    Airway WDL WDL        Respiratory WDL    Respiratory WDL WDL        Skin Circulation/Temperature WDL    Skin Circulation/Temperature WDL WDL        Cardiac WDL    Cardiac WDL WDL        Peripheral/Neurovascular WDL    Peripheral Neurovascular WDL WDL        Cognitive/Neuro/Behavioral WDL    Cognitive/Neuro/Behavioral WDL WDL

## 2024-03-21 NOTE — ED PROVIDER NOTES
EMERGENCY DEPARTMENT ENCOUNTER   NAME: Luiz Herbetr ; AGE: 48 year old male ; YOB: 1975 ; MRN: [id number] ; PCP: System, Provider Not In     Evaluation Date & Time: No admission date for patient encounter.    ED Provider: Khadijah Puente PA-C    CHIEF COMPLAINT     Abdominal Pain and Emesis      FINAL ASSESSMENT       ICD-10-CM    1. Nausea and vomiting  R11.2       2. Abdominal pain  R10.9           ED COURSE, MEDICAL DECISION MAKING, PLAN     ED course   5:07 PM I met with the patient, obtained history, performed an initial exam, and discussed options and plan for diagnostics and treatment here in the ED.  7:29 PM I rechecked and updated patient. Still having pain. Will order additional meds and fluids.   8:30 PM I rechecked patient. He is feeling much better. Plan for discharge by RN.   ______________________________________________________________________    Luiz Herbert is a 48 year old male with pertinent medical history of alcohol abuse disorder, HTN, HLD, anxiety presenting for abdominal pain and vomiting that started last night.  Has had numerous episodes of vomiting and dry heaving.  Abdominal pain is most prominent in the epigastrium, but does encompass his entire abdomen.  He has not had any diarrhea.  No fevers.    On exam patient is nontoxic-appearing.  No acute distress.  Significant epigastric pain on palpation.  He also has generalized abdominal pain, but less severe than the epigastric region.  Abdomen is soft with no distention or rigidity.  Rest of the exam is unremarkable.  Blood pressure elevated to 179/112, but he is otherwise vitally normal.    Considered pancreatitis, cholecystitis, cholangitis, esophageal perforation, GERD, gastritis, colitis.    Workup here is largely unremarkable.  Bilirubin is scantly elevated at 1.6 with a bilirubin direct at 0.37.  No leukocytosis or anemia.  BMP shows an elevated glucose to 167 and slightly elevated anion gap at 17.  Normal  lipase.    CT of the abdomen pelvis with contrast was obtained.  Radiologist read:1.  A 4.7 x 2.8 x 2.4 cm upper retrocaval lymph node and a 1.4 cm inferior right paracaval lymph node are indeterminate. The larger upper retrocaval lymph node would be amenable to CT-guided biopsy.  2.  Borderline mural thickening throughout the colon and rectum and prominence of the pericolic and mesorectal vasculature are again seen and indeterminate for normal physiologic appearance versus a mild nonspecific colitis.  3.  Small esophageal hiatal hernia unchanged.  4.  Moderate diffuse hepatic steatosis unchanged.    History and exam appear to be most consistent with colitis.  No red flag signs or symptoms present to suggest an acutely serious or life-threatening condition at this point that would require hospitalization.  Very low concern for esophageal perforation as he is not having any chest pain, difficulty breathing, tachycardia, neck pain, no leukocytosis.  This does not appear to be pancreatitis with a normal lipase and negative CT scan.  No cholecystitis or cholangitis seen on CT.    Patient was given 1 L of normal saline, 0.5 mg of Dilaudid, and 4 mg of Zofran initially.  It did improve his symptoms, but after about an hour pain was still at about a 5 out of 10.  He was then additionally given 25 mg of Dilaudid, 20 mg of Pepcid, 40 mg of Protonix, and an additional liter of fluid.  On recheck he is feeling significantly improved.  Vital signs have significantly improved as well.    Will discharge patient with Zofran to use as needed.  He will resume his omeprazole daily.  He will use Maalox as needed.    He will also arrange a follow-up appointment with PCP to discuss the CT findings.    Indications for reevaluation back in the ER discussed with patient.    Will discharge to home in good condition.    ______________________________________________________________________    *All pertinent lab & imaging studies  "independently reviewed. (See chart for details)   *Discussed the results of all the tests and plan with patient and family/guardians.   *All questions were answered.   *The patient and/or family/guardian acknowledged understanding and was agreeable with the care plan.      HISTORY OF PRESENT ILLNESS   Patient information was obtained from: Patient   Use of Intrepreter: N/A     Luiz Herbert is a 48 year old male with a pertinent history of alcohol use disorder, HTN, HLD, anxiety who presents to the ED by walk-in for evaluation of abdominal pain and vomiting.    Patient reports multiple episodes of vomiting that began last night. He felt fine earlier that day. This morning, he felt fine again but his vomiting returned. He reports \"20-30 episode of vomiting\".  Mainly has dry heaves now. He also developed epigastric abdominal pain. His pain radiates to the whole abdomen.     He endorses daily alcohol use. He typically drinks 3-4 beers and 5-6 shots per day. Patient had GI work-up done in the past, and was started on omeprazole for GERD. He reports that he also cut down his alcohol intake. Since then, he has been on omeprazole for about 2 months but stopped taking a couple of days ago. He states he had been doing well until last night. He has never had pancreatitis.     He denies diarrhea, fevers, and any other symptoms.    Per chart review, patient was seen at [facility] urgent care for epigastric pain on 3/21/24 (today). Patient reported one day history of sudden epigastric abdominal pain with radiation to whole abdomen. Had chills, nausea, sweats, and vomiting. Was previously taking omeprazole for an extended period of time but stopped a 4-5 days ago. GI cocktail here relieved some of his pain but still had a considerable amount of pain. Referred to the ER for further work-up.    MEDICAL HISTORY     Past Medical History:   Diagnosis Date    Anemia     Anxiety     Depressive disorder     Essential familial " "hypercholesterolemia        History reviewed. No pertinent surgical history.    No family history on file.         ondansetron (ZOFRAN ODT) 4 MG ODT tab  cyclobenzaprine (FLEXERIL) 10 MG tablet  escitalopram oxalate (LEXAPRO) 10 MG tablet          PHYSICAL EXAM     First Vitals:  Patient Vitals for the past 24 hrs:   BP Temp Temp src Pulse Resp SpO2 Height Weight   03/21/24 1912 (!) 182/111 -- -- 64 -- 100 % -- --   03/21/24 1843 -- 97.3  F (36.3  C) Oral -- -- -- -- --   03/21/24 1840 (!) 192/112 -- -- -- -- -- -- --   03/21/24 1703 (!) 179/112 -- Temporal 80 16 100 % 1.753 m (5' 9\") 83.9 kg (185 lb)         PHYSICAL EXAM:   Constitutional: No acute distress.  Neuro: Awake and alert.   Psych: Calm and cooperative.  Eyes: PERRL. EOMI. Conjunctivae clear.     Mouth: Pink and moist.   Cardio: Regular rate. Adequate perfusion to extremities. Regular rhythm. No murmurs.  Pulmonary: Oxygenating well on RA. No labored breathing. CTA b/l.  Abdomen: Significant epigastric pain with palpation.  Fairly diffuse generalized abdominal pain as well, but less severe than the epigastric region.  No distention or rigidity.  No rebound.  BS present.   Upper extremities: Moves freely.  Lower extremities: Moves freely. No edema.   Skin: Natural color, warm, dry, intact.       RESULTS     LAB:  All pertinent labs reviewed and interpreted  Labs Ordered and Resulted from Time of ED Arrival to Time of ED Departure   BASIC METABOLIC PANEL - Abnormal       Result Value    Sodium 135      Potassium 3.6      Chloride 95 (*)     Carbon Dioxide (CO2) 23      Anion Gap 17 (*)     Urea Nitrogen 17.4      Creatinine 0.80      GFR Estimate >90      Calcium 10.4 (*)     Glucose 167 (*)    HEPATIC FUNCTION PANEL - Abnormal    Protein Total 8.0      Albumin 5.0      Bilirubin Total 1.6 (*)     Alkaline Phosphatase 74      AST 28      ALT 25      Bilirubin Direct 0.37 (*)    CBC WITH PLATELETS AND DIFFERENTIAL - Abnormal    WBC Count 10.7      RBC Count " 4.50      Hemoglobin 14.4      Hematocrit 39.9 (*)     MCV 89      MCH 32.0      MCHC 36.1      RDW 12.3      Platelet Count 252      % Neutrophils 90      % Lymphocytes 6      % Monocytes 4      % Eosinophils 0      % Basophils 0      % Immature Granulocytes 0      NRBCs per 100 WBC 0      Absolute Neutrophils 9.6 (*)     Absolute Lymphocytes 0.7 (*)     Absolute Monocytes 0.4      Absolute Eosinophils 0.0      Absolute Basophils 0.0      Absolute Immature Granulocytes 0.0      Absolute NRBCs 0.0     LIPASE - Normal    Lipase 32         RADIOLOGY:  CT Abdomen Pelvis w Contrast   Final Result   IMPRESSION:    1.  A 4.7 x 2.8 x 2.4 cm upper retrocaval lymph node and a 1.4 cm inferior right paracaval lymph node are indeterminate. The larger upper retrocaval lymph node would be amenable to CT-guided biopsy.   2.  Borderline mural thickening throughout the colon and rectum and prominence of the pericolic and mesorectal vasculature are again seen and indeterminate for normal physiologic appearance versus a mild nonspecific colitis.   3.  Small esophageal hiatal hernia unchanged.   4.  Moderate diffuse hepatic steatosis unchanged.             ECG:    N/A      PROCEDURES     None           History:  Supplemental history from: Documented in chart  External Record(s) reviewed: Documented in chart    Work Up:  Chart documentation includes differentials considered and any EKGs or imaging independently interpreted by provider, where specified.  In additional to work up documented, I considered the following work up: Documented in chart, if applicable.    External consultation:  Discussion of management with another provider: Documented in chart, if applicable    Complicating factors:  Care impacted by chronic illness: N/A  Care affected by social determinants of health: Alcohol Abuse and/or Recreational Drug Use    Disposition considerations: Discharge. I prescribed additional prescription strength medication(s) as charted. See  documentation for any additional details.    FINAL IMPRESSION:    ICD-10-CM    1. Nausea and vomiting  R11.2       2. Abdominal pain  R10.9             MEDICATIONS GIVEN IN THE EMERGENCY DEPARTMENT:  Medications   sodium chloride 0.9% BOLUS 1,000 mL (1,000 mLs Intravenous $New Bag 3/21/24 1940)   sodium chloride 0.9% BOLUS 1,000 mL (1,000 mLs Intravenous $New Bag 3/21/24 1738)   HYDROmorphone (PF) (DILAUDID) injection 0.5 mg (0.5 mg Intravenous $Given 3/21/24 1838)   ondansetron (ZOFRAN) injection 4 mg (4 mg Intravenous $Given 3/21/24 1742)   iopamidol (ISOVUE-370) solution 90 mL (90 mLs Intravenous $Given 3/21/24 1822)   famotidine (PEPCID) injection 20 mg (20 mg Intravenous $Given 3/21/24 1940)   pantoprazole (PROTONIX) IV push injection 40 mg (40 mg Intravenous $Given 3/21/24 1940)   HYDROmorphone (PF) (DILAUDID) injection 0.5 mg (0.5 mg Intravenous $Given 3/21/24 1940)         NEW PRESCRIPTIONS STARTED AT TODAY'S ED VISIT:  New Prescriptions    ONDANSETRON (ZOFRAN ODT) 4 MG ODT TAB    Take 1 tablet (4 mg) by mouth every 8 hours as needed for nausea            IAi, am serving as a scribe to document services personally performed by Khadijah Puente PA-C, based on my observation and the provider's statements to me. I, Khadijah Puente PA-C attest that Ai Rouse is acting in a scribe capacity, has observed my performance of the services and has documented them in accordance with my direction.     Some or all of this documentation has been completed using dictation software and mild grammatical errors may be present. Please contact me with any concerns regarding this.       Khadijah Puente PA-C  Emergency Medicine   [facility] EMERGENCY ROOM       Khadijah Puente PA-C  03/21/24 2040

## 2024-03-22 NOTE — DISCHARGE INSTRUCTIONS
Please arrange a follow-up with your primary care provider to discuss the enlarged lymph nodes seen on CT scan.    Use the antinausea medication as needed.  Make sure to drink plenty of water to help with hydration.  [place] diet as tolerated with small frequent meals.

## 2024-03-22 NOTE — ED NOTES
"Pt reporting upper abdominal pain. 6/10. States, \"I can find a ride home\". Pt aware that he cannot drive up to 4 hours with med.   "

## 2024-03-23 ENCOUNTER — APPOINTMENT (OUTPATIENT)
Dept: ULTRASOUND IMAGING | Facility: CLINIC | Age: 49
End: 2024-03-23
Attending: STUDENT IN AN ORGANIZED HEALTH CARE EDUCATION/TRAINING PROGRAM
Payer: COMMERCIAL

## 2024-03-23 ENCOUNTER — HOSPITAL ENCOUNTER (EMERGENCY)
Facility: CLINIC | Age: 49
Discharge: HOME | End: 2024-03-23
Attending: STUDENT IN AN ORGANIZED HEALTH CARE EDUCATION/TRAINING PROGRAM | Admitting: STUDENT IN AN ORGANIZED HEALTH CARE EDUCATION/TRAINING PROGRAM
Payer: COMMERCIAL

## 2024-03-23 VITALS
SYSTOLIC BLOOD PRESSURE: 159 MMHG | HEIGHT: 69 IN | RESPIRATION RATE: 19 BRPM | DIASTOLIC BLOOD PRESSURE: 101 MMHG | WEIGHT: 185 LBS | OXYGEN SATURATION: 97 % | TEMPERATURE: 97.8 F | HEART RATE: 74 BPM | BODY MASS INDEX: 27.4 KG/M2

## 2024-03-23 DIAGNOSIS — R10.13 EPIGASTRIC PAIN: ICD-10-CM

## 2024-03-23 PROBLEM — M25.522 PAIN OF BOTH ELBOWS: Status: ACTIVE | Noted: 2021-04-15

## 2024-03-23 PROBLEM — M25.562 CHRONIC PAIN OF BOTH KNEES: Status: ACTIVE | Noted: 2021-04-15

## 2024-03-23 PROBLEM — G89.29 CHRONIC PAIN OF BOTH KNEES: Status: ACTIVE | Noted: 2021-04-15

## 2024-03-23 PROBLEM — M54.50 LOWER BACK PAIN: Status: ACTIVE | Noted: 2024-03-23

## 2024-03-23 PROBLEM — R20.2 PARESTHESIA OF RIGHT ARM: Status: ACTIVE | Noted: 2024-03-04

## 2024-03-23 PROBLEM — E78.00 PRIMARY HYPERCHOLESTEROLEMIA: Status: ACTIVE | Noted: 2024-03-23

## 2024-03-23 PROBLEM — D64.9 ANEMIA: Status: ACTIVE | Noted: 2024-03-23

## 2024-03-23 PROBLEM — M25.561 CHRONIC PAIN OF BOTH KNEES: Status: ACTIVE | Noted: 2021-04-15

## 2024-03-23 PROBLEM — F10.90 ALCOHOL USE DISORDER: Status: ACTIVE | Noted: 2024-01-25

## 2024-03-23 PROBLEM — M25.521 PAIN OF BOTH ELBOWS: Status: ACTIVE | Noted: 2021-04-15

## 2024-03-23 PROBLEM — I10 HYPERTENSION: Status: ACTIVE | Noted: 2024-01-25

## 2024-03-23 LAB
ALBUMIN SERPL BCG-MCNC: 4.7 G/DL (ref 3.5–5.2)
ALP SERPL-CCNC: 72 U/L (ref 40–150)
ALT SERPL W P-5'-P-CCNC: 27 U/L (ref 0–70)
ANION GAP SERPL CALCULATED.3IONS-SCNC: 16 MMOL/L (ref 7–15)
AST SERPL W P-5'-P-CCNC: 27 U/L (ref 0–45)
ATRIAL RATE - MUSE: 63 BPM
BASOPHILS # BLD AUTO: 0 10E3/UL (ref 0–0.2)
BASOPHILS NFR BLD AUTO: 0 %
BILIRUB SERPL-MCNC: 1.4 MG/DL
BUN SERPL-MCNC: 14.7 MG/DL (ref 6–20)
CALCIUM SERPL-MCNC: 9.8 MG/DL (ref 8.6–10)
CHLORIDE SERPL-SCNC: 96 MMOL/L (ref 98–107)
CREAT SERPL-MCNC: 0.89 MG/DL (ref 0.67–1.17)
DEPRECATED HCO3 PLAS-SCNC: 25 MMOL/L (ref 22–29)
DIASTOLIC BLOOD PRESSURE - MUSE: NORMAL MMHG
EGFRCR SERPLBLD CKD-EPI 2021: >90 ML/MIN/1.73M2
EOSINOPHIL # BLD AUTO: 0 10E3/UL (ref 0–0.7)
EOSINOPHIL NFR BLD AUTO: 0 %
ERYTHROCYTE [DISTWIDTH] IN BLOOD BY AUTOMATED COUNT: 12.7 % (ref 10–15)
GLUCOSE SERPL-MCNC: 119 MG/DL (ref 70–99)
HCT VFR BLD AUTO: 41.1 % (ref 40–53)
HGB BLD-MCNC: 14.6 G/DL (ref 13.3–17.7)
IMM GRANULOCYTES # BLD: 0.1 10E3/UL
IMM GRANULOCYTES NFR BLD: 1 %
INTERPRETATION ECG - MUSE: NORMAL
LIPASE SERPL-CCNC: 43 U/L (ref 13–60)
LYMPHOCYTES # BLD AUTO: 1.4 10E3/UL (ref 0.8–5.3)
LYMPHOCYTES NFR BLD AUTO: 13 %
MAGNESIUM SERPL-MCNC: 1.8 MG/DL (ref 1.7–2.3)
MCH RBC QN AUTO: 31.7 PG (ref 26.5–33)
MCHC RBC AUTO-ENTMCNC: 35.5 G/DL (ref 31.5–36.5)
MCV RBC AUTO: 89 FL (ref 78–100)
MONOCYTES # BLD AUTO: 0.7 10E3/UL (ref 0–1.3)
MONOCYTES NFR BLD AUTO: 6 %
NEUTROPHILS # BLD AUTO: 8.4 10E3/UL (ref 1.6–8.3)
NEUTROPHILS NFR BLD AUTO: 80 %
NRBC # BLD AUTO: 0 10E3/UL
NRBC BLD AUTO-RTO: 0 /100
P AXIS - MUSE: 19 DEGREES
PLATELET # BLD AUTO: 262 10E3/UL (ref 150–450)
POTASSIUM SERPL-SCNC: 3.4 MMOL/L (ref 3.4–5.3)
PR INTERVAL - MUSE: 146 MS
PROT SERPL-MCNC: 7.9 G/DL (ref 6.4–8.3)
QRS DURATION - MUSE: 102 MS
QT - MUSE: 596 MS
QTC - MUSE: 609 MS
R AXIS - MUSE: -36 DEGREES
RBC # BLD AUTO: 4.61 10E6/UL (ref 4.4–5.9)
SODIUM SERPL-SCNC: 137 MMOL/L (ref 135–145)
SYSTOLIC BLOOD PRESSURE - MUSE: NORMAL MMHG
T AXIS - MUSE: -15 DEGREES
VENTRICULAR RATE- MUSE: 63 BPM
WBC # BLD AUTO: 10.5 10E3/UL (ref 4–11)

## 2024-03-23 PROCEDURE — 250N000011 HC RX IP 250 OP 636: Performed by: STUDENT IN AN ORGANIZED HEALTH CARE EDUCATION/TRAINING PROGRAM

## 2024-03-23 PROCEDURE — 36415 COLL VENOUS BLD VENIPUNCTURE: CPT | Performed by: STUDENT IN AN ORGANIZED HEALTH CARE EDUCATION/TRAINING PROGRAM

## 2024-03-23 PROCEDURE — 96374 THER/PROPH/DIAG INJ IV PUSH: CPT

## 2024-03-23 PROCEDURE — 83735 ASSAY OF MAGNESIUM: CPT | Performed by: STUDENT IN AN ORGANIZED HEALTH CARE EDUCATION/TRAINING PROGRAM

## 2024-03-23 PROCEDURE — 83690 ASSAY OF LIPASE: CPT | Performed by: STUDENT IN AN ORGANIZED HEALTH CARE EDUCATION/TRAINING PROGRAM

## 2024-03-23 PROCEDURE — 250N000013 HC RX MED GY IP 250 OP 250 PS 637: Performed by: STUDENT IN AN ORGANIZED HEALTH CARE EDUCATION/TRAINING PROGRAM

## 2024-03-23 PROCEDURE — 82040 ASSAY OF SERUM ALBUMIN: CPT | Performed by: STUDENT IN AN ORGANIZED HEALTH CARE EDUCATION/TRAINING PROGRAM

## 2024-03-23 PROCEDURE — 93005 ELECTROCARDIOGRAM TRACING: CPT | Performed by: STUDENT IN AN ORGANIZED HEALTH CARE EDUCATION/TRAINING PROGRAM

## 2024-03-23 PROCEDURE — 96361 HYDRATE IV INFUSION ADD-ON: CPT

## 2024-03-23 PROCEDURE — 85025 COMPLETE CBC W/AUTO DIFF WBC: CPT | Performed by: STUDENT IN AN ORGANIZED HEALTH CARE EDUCATION/TRAINING PROGRAM

## 2024-03-23 PROCEDURE — 76705 ECHO EXAM OF ABDOMEN: CPT

## 2024-03-23 PROCEDURE — 258N000003 HC RX IP 258 OP 636: Performed by: STUDENT IN AN ORGANIZED HEALTH CARE EDUCATION/TRAINING PROGRAM

## 2024-03-23 PROCEDURE — 99285 EMERGENCY DEPT VISIT HI MDM: CPT | Mod: 25

## 2024-03-23 PROCEDURE — 96375 TX/PRO/DX INJ NEW DRUG ADDON: CPT

## 2024-03-23 RX ORDER — MORPHINE SULFATE 4 MG/ML
4 INJECTION, SOLUTION INTRAMUSCULAR; INTRAVENOUS ONCE
Status: COMPLETED | OUTPATIENT
Start: 2024-03-23 | End: 2024-03-23

## 2024-03-23 RX ORDER — SUCRALFATE ORAL 1 G/10ML
1 SUSPENSION ORAL
Status: DISCONTINUED | OUTPATIENT
Start: 2024-03-23 | End: 2024-03-23 | Stop reason: HOSPADM

## 2024-03-23 RX ORDER — LORAZEPAM 2 MG/ML
1 INJECTION INTRAMUSCULAR ONCE
Status: COMPLETED | OUTPATIENT
Start: 2024-03-23 | End: 2024-03-23

## 2024-03-23 RX ORDER — SUCRALFATE ORAL 1 G/10ML
1 SUSPENSION ORAL 4 TIMES DAILY
Qty: 414 ML | Refills: 0 | Status: SHIPPED | OUTPATIENT
Start: 2024-03-23

## 2024-03-23 RX ADMIN — SUCRALFATE 1 G: 1 SUSPENSION ORAL at 09:00

## 2024-03-23 RX ADMIN — SODIUM CHLORIDE 1000 ML: 9 INJECTION, SOLUTION INTRAVENOUS at 08:58

## 2024-03-23 RX ADMIN — MORPHINE SULFATE 4 MG: 4 INJECTION, SOLUTION INTRAMUSCULAR; INTRAVENOUS at 08:59

## 2024-03-23 RX ADMIN — LORAZEPAM 1 MG: 2 INJECTION INTRAMUSCULAR; INTRAVENOUS at 08:59

## 2024-03-23 RX ADMIN — FAMOTIDINE 20 MG: 10 INJECTION, SOLUTION INTRAVENOUS at 09:00

## 2024-03-23 ASSESSMENT — ENCOUNTER SYMPTOMS
ROS GI COMMENTS: POSITIVE FOR BLOATING
FEVER: 0
VOMITING: 0
DIARRHEA: 0
NAUSEA: 0
ABDOMINAL PAIN: 1

## 2024-03-23 ASSESSMENT — ACTIVITIES OF DAILY LIVING (ADL)
ADLS_ACUITY_SCORE: 35

## 2024-03-23 NOTE — ED TRIAGE NOTES
Came in 2 days ago because he stopped his Prilosec too soon.  Was discharge and restarted on medications, but the pain in the last 15 hours has not helped, doesn't know if the medications hasn't started to help or if something else is wrong.    Did not take anything else for pain, took Prilosec over night.  Believes he is more labored breathing because of sxs     Triage Assessment (Adult)       Row Name 03/23/24 0804          Triage Assessment    Airway WDL WDL        Respiratory WDL    Respiratory WDL WDL        Skin Circulation/Temperature WDL    Skin Circulation/Temperature WDL WDL        Cardiac WDL    Cardiac WDL WDL        Peripheral/Neurovascular WDL    Peripheral Neurovascular WDL WDL        Cognitive/Neuro/Behavioral WDL    Cognitive/Neuro/Behavioral WDL WDL

## 2024-03-23 NOTE — ED PROVIDER NOTES
EMERGENCY DEPARTMENT ENCOUNTER      NAME: Luiz Herbert  AGE: 48 year old male  YOB: 1975  MRN: [id number]  EVALUATION DATE & TIME: No admission date for patient encounter.    [facility]    ED PROVIDER: Wei Arrington M.D.      Chief Complaint   Patient presents with    Abdominal Pain     Same sx as 2 days ago         FINAL IMPRESSION:  1. Epigastric pain          ED COURSE & MEDICAL DECISION MAKING:    Pertinent Labs & Imaging studies reviewed. (See chart for details)  48 year old male presents to the Emergency Department for evaluation of epigastric pain.  Patient was seen here yesterday for similar.  CT scan showed enlarged lymph nodes which could need follow-up as well as some possible thickening of the colon that was indeterminant but no other acute or clear issue.  Patient does have a history of some alcohol use and so pancreatitis was considered as well as a gastritis or ulcer yesterday.  Patient felt better and now has had worsening symptoms for the last 12 to 20 hours which she says restarted after having drank some alcohol and had some food.  Based on presentation I was concerned with possible gallbladder issue versus gastritis versus pancreatitis versus ulcer.  Felt that any sort of intra-abdominal catastrophe or small bowel obstruction or solid organ issue was otherwise less likely.  Considered CT scan although did not believe this was ultimately warranted based on findings on the scan from his last visit.  Ultrasound performed shows no obvious gallbladder pathology or stone.  Blood work is reassuring.  Lipase is normal.  There is a small elevation in anion gap which I believe is secondary to alcohol use.  Patient's symptoms improved here with some Carafate and pain medicine.  Discussed at length with him that the etiology is somewhat unclear however I favor gastritis or a mild pancreatitis.  Discussed whether with this being his second visit we should admit  him to the hospital for observation but ultimately utilizing shared decision making decided on discharge.  I will prescribe him some Carafate for symptom relief have him abstain from alcohol and start with a bland diet and advance as tolerated.  Also recommended that he follow-up with his primary doctor in the next few days.    At the conclusion of the encounter I discussed the results of all of the tests and the disposition. The questions were answered. The patient or family acknowledged understanding and was agreeable with the care plan.        8:02 AM I met with the patient, obtained history, performed an initial exam, and discussed options and plan for diagnostics and treatment here in the ED.  12:16 PM We discussed plans for discharge including supportive cares, symptomatic treatment, outpatient follow up, and reasons to return to the emergency department.    Medical Decision Making  Obtained supplemental history:Supplemental history obtained?: No  Reviewed external records: External records reviewed?: Inpatient Record: [facility] ED (3/21/2024)  Care impacted by chronic illness:Hyperlipidemia, Hypertension, and Other: depression,anemia, alcohol use disorder  Care significantly affected by social determinants of health:Alcohol Abuse and/or Recreational Drug Use  Did you consider but not order tests?: Work up considered but not performed and documented in chart, if applicable  Did you interpret images independently?: Independent interpretation of ECG and images noted in documentation, when applicable.  Consultation discussion with other provider:Did you involve another provider (consultant, , pharmacy, etc.)?: No  Discharge. I prescribed additional prescription strength medication(s) as charted. See documentation for any additional details.    This patient involved a high degree of complexity in medical decision making, as significant risks were present and assessed. Recent encounters & results in medical  record reviewed by me.     All workup (i.e. any EKG/labs/imaging as per charting below) reviewed and independently interpreted by me. See respective sections for details.       minutes of critical care time     MEDICATIONS GIVEN IN THE EMERGENCY:  Medications   LORazepam (ATIVAN) injection 1 mg (1 mg Intravenous $Given 3/23/24 0859)   famotidine (PEPCID) injection 20 mg (20 mg Intravenous $Given 3/23/24 0900)   morphine (PF) injection 4 mg (4 mg Intravenous $Given 3/23/24 0859)   sodium chloride 0.9% BOLUS 1,000 mL (0 mLs Intravenous Stopped 3/23/24 0958)       NEW PRESCRIPTIONS STARTED AT TODAY'S ER VISIT  Discharge Medication List as of 3/23/2024 12:28 PM        START taking these medications    Details   sucralfate (CARAFATE) 1 GM/10ML suspension Take 10 mLs (1 g) by mouth 4 times daily, Disp-414 mL, R-0, Local Print                =================================================================    HPI    Patient information was obtained from: patient     Use of : N/A        Luiz Herbert is a 48 year old male with a pertinent history of hypertension, hyperlipidemia, depression, and anemia who presents for evaluation of epigastric abdominal pain.    Per Chart Review:    Patient seen yesterday (3/21/2024) at  ED for epigastric abdominal pain and vomiting that started in the night on 3/20/2024. He endorsed daily alcohol use. He typically drinks 3-4 beers and 5-6 shots per day. Work up was largely unremarkable. He was discharged with zofran.     CT abdomen Pelvis:  1.  A 4.7 x 2.8 x 2.4 cm upper retrocaval lymph node and a 1.4 cm inferior right paracaval lymph node was indeterminate. The larger upper retrocaval lymph node was be amenable to CT-guided biopsy.  2.  Borderline mural thickening throughout the colon and rectum and prominence of the pericolic and mesorectal vasculature are again seen and indeterminate for normal physiologic appearance versus a mild nonspecific colitis.  3.  Small  esophageal hiatal hernia was unchanged.  4.  Moderate diffuse hepatic steatosis was unchanged.    The patient reports that he has the same intense epigastric pain that he had when he was seen yesterday (3/21/2023) in the ED. After leaving the ED, yesterday, patient said that he felt well and the pain was relieved. However, this morning the pain the pain returned. He also noted that he has shortness of breath secondary to the pain and he feels bloated. When he went home yesterday, patient ate and drank alcohol. Of note, he endorses daily alcohol use. He thinks that this pain might be related to his drinking habits. However, it is not specifically alcohol use that brings the pain on.    2 months ago, patient said that he felt discomfort in the same area, however the pain was not as severe. He was seen by his PCP, who prescribed him Prilosec. Patient was taking this daily until last Saturday (3/16/2024), as he ran out.     Denies hx of pancreatitis. Denies abdominal surgeries. Denies fever, nausea, vomiting, diarrhea or any other complaints at this time.     REVIEW OF SYSTEMS   Review of Systems   Constitutional:  Negative for fever.   Gastrointestinal:  Positive for abdominal pain (epigastrium). Negative for diarrhea, nausea and vomiting.        Positive for bloating        PAST MEDICAL HISTORY:  Past Medical History:   Diagnosis Date    Anemia     Anxiety     Depressive disorder     Essential familial hypercholesterolemia        PAST SURGICAL HISTORY:  No past surgical history on file.        CURRENT MEDICATIONS:    sucralfate (CARAFATE) 1 GM/10ML suspension  cyclobenzaprine (FLEXERIL) 10 MG tablet  escitalopram oxalate (LEXAPRO) 10 MG tablet  ondansetron (ZOFRAN ODT) 4 MG ODT tab        ALLERGIES:  No Known Allergies    FAMILY HISTORY:  No family history on file.    SOCIAL HISTORY:   Social History     Socioeconomic History    Marital status:        PHYSICAL EXAM    VITAL SIGNS: BP (!) 159/101   Pulse 74    "Temp 97.8  F (36.6  C) (Oral)   Resp 19   Ht 1.753 m (5' 9\")   Wt 83.9 kg (185 lb)   SpO2 97%   BMI 27.32 kg/m    Constitutional:  Well developed, well nourished   EYES: Conjunctivae clear, no discharge  HENT: Atraumatic, normocephalic, bilateral external ears normal.  Oropharynx moist. Nose normal.   Neck: Normal ROM , Supple   GI:Soft, No masses, No flank tenderness. No rebound or guarding. Pain in epigastrium. Townsend sign negative. No peritoneal sign.   Respiratory:  No respiratory distress, normal nonlabored respirations.   Cardiovascular:  Distal perfusion appears intact  Musculoskeletal:  No edema appreciated, No cyanosis, No clubbing. Good range of motion in all major joints.   Integument:  Warm, Dry, No erythema, No rash.   Neurologic:  Alert and oriented. No focal deficits noted.  Ambulatory  Psychiatric:  Affect normal         LAB:  All pertinent labs reviewed and interpreted.  Labs Ordered and Resulted from Time of ED Arrival to Time of ED Departure   COMPREHENSIVE METABOLIC PANEL - Abnormal       Result Value    Sodium 137      Potassium 3.4      Carbon Dioxide (CO2) 25      Anion Gap 16 (*)     Urea Nitrogen 14.7      Creatinine 0.89      GFR Estimate >90      Calcium 9.8      Chloride 96 (*)     Glucose 119 (*)     Alkaline Phosphatase 72      AST 27      ALT 27      Protein Total 7.9      Albumin 4.7      Bilirubin Total 1.4 (*)    CBC WITH PLATELETS AND DIFFERENTIAL - Abnormal    WBC Count 10.5      RBC Count 4.61      Hemoglobin 14.6      Hematocrit 41.1      MCV 89      MCH 31.7      MCHC 35.5      RDW 12.7      Platelet Count 262      % Neutrophils 80      % Lymphocytes 13      % Monocytes 6      % Eosinophils 0      % Basophils 0      % Immature Granulocytes 1      NRBCs per 100 WBC 0      Absolute Neutrophils 8.4 (*)     Absolute Lymphocytes 1.4      Absolute Monocytes 0.7      Absolute Eosinophils 0.0      Absolute Basophils 0.0      Absolute Immature Granulocytes 0.1      Absolute NRBCs " 0.0     LIPASE - Normal    Lipase 43     MAGNESIUM - Normal    Magnesium 1.8         RADIOLOGY:  Reviewed all pertinent imaging. Please see official radiology report.  US Abdomen Limited   Final Result   IMPRESSION:   1.  No evidence of cholelithiasis or cholecystitis.   2.  Bulky retrocaval adenopathy is just appreciated on ultrasound medial to the right kidney.                EKG:    Performed at: March 23, 2024, 23-MAR-2024 AM, [facility] EMERGENCY ROOM    Impression: Sinus rhythm with sinus arrhythmia. Left axis deviation. Prolonged QT. Left anterior fascicular block. When compared wit ECG of 24-OCT-2021 18:12, non specific ST changes in V5 and V6 are new.     Rate: 63 BPM  Rhythm: Sinus rhythm with sinus arrhythmia  Axis: 19 -36 -15  ND Interval: 146 ms  QRS Interval: 102 ms   QTc Interval: 596/606 ms  ST Changes: Prolonged QT. Non specific ST changes in V5 and V6  Comparison: When compared wit ECG of 24-OCT-2021 18:12, non specific ST changes in V5 and V6 are new.     I have independently reviewed and interpreted the EKG(s) documented above.      I, Nimesh Escalante, am serving as a scribe to document services personally performed by Dr. Wei Arrington based on my observation and the provider's statements to me. I, Wei Arrington MD attest that Nimesh Escalante is acting in a scribe capacity, has observed my performance of the services and has documented them in accordance with my direction.    Wei Arrington M.D.  Emergency Medicine  [facility] EMERGENCY ROOM  [address] [facility] [facility] [id number] [phone]  Dept: [phone]       Wei Arrington MD  03/24/24 0849

## 2024-09-01 ENCOUNTER — HOSPITAL ENCOUNTER (EMERGENCY)
Facility: CLINIC | Age: 49
Discharge: HOME | End: 2024-09-01
Payer: COMMERCIAL

## 2024-09-01 VITALS
HEART RATE: 62 BPM | DIASTOLIC BLOOD PRESSURE: 88 MMHG | RESPIRATION RATE: 16 BRPM | OXYGEN SATURATION: 100 % | BODY MASS INDEX: 25.43 KG/M2 | SYSTOLIC BLOOD PRESSURE: 176 MMHG | TEMPERATURE: 98.3 F | WEIGHT: 177.6 LBS | HEIGHT: 70 IN

## 2024-09-01 DIAGNOSIS — R11.2 NAUSEA AND VOMITING: ICD-10-CM

## 2024-09-01 DIAGNOSIS — F10.10 ALCOHOL ABUSE: ICD-10-CM

## 2024-09-01 DIAGNOSIS — E86.0 DEHYDRATION: ICD-10-CM

## 2024-09-01 DIAGNOSIS — R10.13 EPIGASTRIC PAIN: ICD-10-CM

## 2024-09-01 LAB
ALBUMIN SERPL BCG-MCNC: 4.9 G/DL (ref 3.5–5.2)
ALP SERPL-CCNC: 87 U/L (ref 40–150)
ALT SERPL W P-5'-P-CCNC: 28 U/L (ref 0–70)
ANION GAP SERPL CALCULATED.3IONS-SCNC: 25 MMOL/L (ref 7–15)
AST SERPL W P-5'-P-CCNC: 35 U/L (ref 0–45)
ATRIAL RATE - MUSE: 88 BPM
BILIRUB DIRECT SERPL-MCNC: 0.43 MG/DL (ref 0–0.3)
BILIRUB SERPL-MCNC: 2.1 MG/DL
BUN SERPL-MCNC: 19.4 MG/DL (ref 6–20)
CALCIUM SERPL-MCNC: 10.8 MG/DL (ref 8.8–10.4)
CHLORIDE SERPL-SCNC: 97 MMOL/L (ref 98–107)
CREAT SERPL-MCNC: 1.03 MG/DL (ref 0.67–1.17)
DIASTOLIC BLOOD PRESSURE - MUSE: NORMAL MMHG
EGFRCR SERPLBLD CKD-EPI 2021: 88 ML/MIN/1.73M2
ERYTHROCYTE [DISTWIDTH] IN BLOOD BY AUTOMATED COUNT: 12.6 % (ref 10–15)
GLUCOSE BLDC GLUCOMTR-MCNC: 188 MG/DL (ref 70–99)
GLUCOSE SERPL-MCNC: 178 MG/DL (ref 70–99)
HCO3 SERPL-SCNC: 16 MMOL/L (ref 22–29)
HCT VFR BLD AUTO: 41.6 % (ref 40–53)
HGB BLD-MCNC: 15 G/DL (ref 13.3–17.7)
HOLD SPECIMEN: NORMAL
HOLD SPECIMEN: NORMAL
INTERPRETATION ECG - MUSE: NORMAL
LIPASE SERPL-CCNC: 43 U/L (ref 13–60)
MAGNESIUM SERPL-MCNC: 1.8 MG/DL (ref 1.7–2.3)
MCH RBC QN AUTO: 31.8 PG (ref 26.5–33)
MCHC RBC AUTO-ENTMCNC: 36.1 G/DL (ref 31.5–36.5)
MCV RBC AUTO: 88 FL (ref 78–100)
P AXIS - MUSE: 11 DEGREES
PLATELET # BLD AUTO: 275 10E3/UL (ref 150–450)
POTASSIUM SERPL-SCNC: 3.6 MMOL/L (ref 3.4–5.3)
PR INTERVAL - MUSE: 134 MS
PROT SERPL-MCNC: 8.3 G/DL (ref 6.4–8.3)
QRS DURATION - MUSE: 96 MS
QT - MUSE: 392 MS
QTC - MUSE: 474 MS
R AXIS - MUSE: -52 DEGREES
RBC # BLD AUTO: 4.71 10E6/UL (ref 4.4–5.9)
SODIUM SERPL-SCNC: 138 MMOL/L (ref 135–145)
SYSTOLIC BLOOD PRESSURE - MUSE: NORMAL MMHG
T AXIS - MUSE: 10 DEGREES
TROPONIN T SERPL HS-MCNC: <6 NG/L
VENTRICULAR RATE- MUSE: 88 BPM
WBC # BLD AUTO: 11.5 10E3/UL (ref 4–11)

## 2024-09-01 PROCEDURE — 93005 ELECTROCARDIOGRAM TRACING: CPT

## 2024-09-01 PROCEDURE — 85027 COMPLETE CBC AUTOMATED: CPT | Performed by: PHYSICIAN ASSISTANT

## 2024-09-01 PROCEDURE — 80053 COMPREHEN METABOLIC PANEL: CPT | Performed by: PHYSICIAN ASSISTANT

## 2024-09-01 PROCEDURE — 84484 ASSAY OF TROPONIN QUANT: CPT | Performed by: PHYSICIAN ASSISTANT

## 2024-09-01 PROCEDURE — 99284 EMERGENCY DEPT VISIT MOD MDM: CPT | Mod: 25

## 2024-09-01 PROCEDURE — 250N000013 HC RX MED GY IP 250 OP 250 PS 637: Performed by: PHYSICIAN ASSISTANT

## 2024-09-01 PROCEDURE — 96361 HYDRATE IV INFUSION ADD-ON: CPT

## 2024-09-01 PROCEDURE — 83690 ASSAY OF LIPASE: CPT | Performed by: PHYSICIAN ASSISTANT

## 2024-09-01 PROCEDURE — 96374 THER/PROPH/DIAG INJ IV PUSH: CPT | Mod: 59

## 2024-09-01 PROCEDURE — 250N000013 HC RX MED GY IP 250 OP 250 PS 637

## 2024-09-01 PROCEDURE — 96375 TX/PRO/DX INJ NEW DRUG ADDON: CPT

## 2024-09-01 PROCEDURE — 250N000011 HC RX IP 250 OP 636: Performed by: PHYSICIAN ASSISTANT

## 2024-09-01 PROCEDURE — 36415 COLL VENOUS BLD VENIPUNCTURE: CPT | Performed by: PHYSICIAN ASSISTANT

## 2024-09-01 PROCEDURE — 82962 GLUCOSE BLOOD TEST: CPT

## 2024-09-01 PROCEDURE — 258N000003 HC RX IP 258 OP 636: Performed by: PHYSICIAN ASSISTANT

## 2024-09-01 PROCEDURE — 83735 ASSAY OF MAGNESIUM: CPT | Performed by: PHYSICIAN ASSISTANT

## 2024-09-01 RX ORDER — MAGNESIUM HYDROXIDE/ALUMINUM HYDROXICE/SIMETHICONE 120; 1200; 1200 MG/30ML; MG/30ML; MG/30ML
15 SUSPENSION ORAL ONCE
Status: COMPLETED | OUTPATIENT
Start: 2024-09-01 | End: 2024-09-01

## 2024-09-01 RX ORDER — ALUMINA, MAGNESIA, AND SIMETHICONE 2400; 2400; 240 MG/30ML; MG/30ML; MG/30ML
30 SUSPENSION ORAL EVERY 4 HOURS PRN
Qty: 355 ML | Refills: 0 | Status: SHIPPED | OUTPATIENT
Start: 2024-09-01

## 2024-09-01 RX ORDER — ONDANSETRON 4 MG/1
4 TABLET, ORALLY DISINTEGRATING ORAL EVERY 8 HOURS PRN
Qty: 10 TABLET | Refills: 0 | Status: SHIPPED | OUTPATIENT
Start: 2024-09-01 | End: 2024-09-04

## 2024-09-01 RX ORDER — MORPHINE SULFATE 4 MG/ML
4 INJECTION, SOLUTION INTRAMUSCULAR; INTRAVENOUS ONCE
Status: COMPLETED | OUTPATIENT
Start: 2024-09-01 | End: 2024-09-01

## 2024-09-01 RX ORDER — KETOROLAC TROMETHAMINE 15 MG/ML
15 INJECTION, SOLUTION INTRAMUSCULAR; INTRAVENOUS ONCE
Status: COMPLETED | OUTPATIENT
Start: 2024-09-01 | End: 2024-09-01

## 2024-09-01 RX ORDER — ONDANSETRON 2 MG/ML
4 INJECTION INTRAMUSCULAR; INTRAVENOUS ONCE
Status: COMPLETED | OUTPATIENT
Start: 2024-09-01 | End: 2024-09-01

## 2024-09-01 RX ADMIN — MORPHINE SULFATE 4 MG: 4 INJECTION, SOLUTION INTRAMUSCULAR; INTRAVENOUS at 13:51

## 2024-09-01 RX ADMIN — FAMOTIDINE 20 MG: 10 INJECTION, SOLUTION INTRAVENOUS at 13:34

## 2024-09-01 RX ADMIN — ONDANSETRON 4 MG: 2 INJECTION INTRAMUSCULAR; INTRAVENOUS at 13:33

## 2024-09-01 RX ADMIN — KETOROLAC TROMETHAMINE 15 MG: 15 INJECTION, SOLUTION INTRAMUSCULAR; INTRAVENOUS at 15:53

## 2024-09-01 RX ADMIN — SODIUM CHLORIDE 1000 ML: 9 INJECTION, SOLUTION INTRAVENOUS at 14:46

## 2024-09-01 RX ADMIN — SODIUM CHLORIDE 1000 ML: 9 INJECTION, SOLUTION INTRAVENOUS at 13:52

## 2024-09-01 RX ADMIN — ALUMINUM HYDROXIDE, MAGNESIUM HYDROXIDE, AND SIMETHICONE 15 ML: 200; 200; 20 SUSPENSION ORAL at 14:13

## 2024-09-01 RX ADMIN — ALUMINUM HYDROXIDE, MAGNESIUM HYDROXIDE, AND SIMETHICONE 15 ML: 200; 200; 20 SUSPENSION ORAL at 15:12

## 2024-09-01 ASSESSMENT — ACTIVITIES OF DAILY LIVING (ADL)
ADLS_ACUITY_SCORE: 33
ADLS_ACUITY_SCORE: 35

## 2024-09-01 ASSESSMENT — COLUMBIA-SUICIDE SEVERITY RATING SCALE - C-SSRS
6. HAVE YOU EVER DONE ANYTHING, STARTED TO DO ANYTHING, OR PREPARED TO DO ANYTHING TO END YOUR LIFE?: NO
1. IN THE PAST MONTH, HAVE YOU WISHED YOU WERE DEAD OR WISHED YOU COULD GO TO SLEEP AND NOT WAKE UP?: NO
2. HAVE YOU ACTUALLY HAD ANY THOUGHTS OF KILLING YOURSELF IN THE PAST MONTH?: NO

## 2024-09-01 NOTE — ED TRIAGE NOTES
"Arrives to ED with c/o abd pain that began this morning. +N/V. Multiple episodes of vomiting. \"It's all bile now\". Denies diarrhea. Appears pale and diaphoretic in triage. Reports SOB. +chills, afebrile in triage.      Triage Assessment (Adult)       Row Name 09/01/24 9490          Triage Assessment    Airway WDL WDL        Respiratory WDL    Respiratory WDL WDL        Skin Circulation/Temperature WDL    Skin Circulation/Temperature WDL X;circulation  diaphoretic     Skin Circulation pallor        Cardiac WDL    Cardiac WDL X  HTN        Peripheral/Neurovascular WDL    Peripheral Neurovascular WDL WDL        Cognitive/Neuro/Behavioral WDL    Cognitive/Neuro/Behavioral WDL WDL                     "

## 2024-09-01 NOTE — DISCHARGE INSTRUCTIONS
As we discussed, I suspect your symptoms are likely due to inflammation/irritation of your stomach from your alcohol use.  I have placed a referral to a GI specialist for follow-up to consider to a scope to make sure you do not have an ulcer.  Please continue your home omeprazole as prescribed and I will send you home with a prescription for Maalox and Zofran for further symptom relief at home.  If it anytime you develop fever, worsening abdominal pain, uncontrolled vomiting, concerns for worsening dehydration, seizures, shaking, hallucinations please return to the ER for further evaluation.    Your blood pressure was elevated in the emergencydepartment today and requires recheck and close follow-up in your primary care clinic. Untreated blood pressure can cause serious complications including, but not limited to stroke, heart attack/failure, and kidney disease.  Please make a close follow-up appointment to have this recheck performed. Please return to the emergency department immediately if you develop a severe headache, vision changes, chest pain, shortness of breath, orabdominal pain.    Substance Abuse Resources    To access substance abuse treatment you must have an assessment complete or have an updated assessment within 30 days of starting any program.  Information for this to be completed and to secure funding if you have medical assistance or no insurance can be found through your County's [employer] intake line.    If you have private insurance, call the customer service number on the back of your insurance card to find an in-network provider for substance abuse assessment. The ideal provider will be a treatment facility, licensed in the [facility].    For those with Medicaid with the [facility], you will need a Rule 25 assessment: The following are phone numbers for each county. Rule 25 assessments must be completed by the county you reside in currently. Once approved for funding you can connect  with a facility that does Rule 25 assessment.  [facility] [phone]  [place] - [phone]  [facility] [phone]  [place] - [phone]  [facility] [phone]  [place] - [phone]  [place] - [phone]  [facility] [phone]    The following facilities offer Rule 25/chemical health assessments:    [facility]  [phone]  Mon-Friday: [address] [facility], [address]  Saturday:  [address] [facility], [address]  M-F assessments (7a-1:45p); Saturday assessments (7:45-10:45a)    [facility]  [phone]  [address] [place], [place], [address]  *by appointment only M-W; walk ins available Fridays from 10-2.    [facility]  [phone] (phone consultation available 24/7)  In-person Assessments:  [address] [facility], [address], [place] [address]  [address] [place] [address]  [address] [place] [address]  [address] [place] [address]     [facility]  [phone]  [address] [facility] [address] [facility]  [place], [place], [address]  *walk in and appointments available by calling    [facility]  [phone] [address] [place], [place], [address]  *by appointment only M-Th    [facility]  [phone]  [address] [place], [place], [address]  *walk ins available M-F    [facility]  [phone] [address] [place], [address]  *available by appointments only      [facility]  [phone] [address] [place], [place], [address]  *available by appointment only      [facility]  [phone]  [facility]  [address] [place], [place], [address]    [facility] ([facility])  [phone] [address] [facility] [address]  [place], [place], [address]  *Walk in assessments also available M-F starting at 8 am.    [facility]  [phone] [address] [place], [place],  [address]    *available by appointments      [facility]  [phone]  [address] [place], [place], [address]  *walk in assessments available M-F starting at 7 am.    [facility]  [phone]  [facility]  [address] [place], [place], [address]  *Walk in assessments availble M-F starting at 8 am OR [phone]    [facility]  [address] [place] [address]  *Walk in assessments available M-F starting at 8 am    [facility]  [phone]  [address] [place] [address]    [facility]  [phone]  [address] [place], [place], [address]    *available by appointment only    [facility]  [phone]  [address] [place], [address]    [facility]  [phone]  [address] [place], [address]  *available by appointment    [facility]  [phone]  [address] [place], [place], [address]  *walk ins available M-TH from 9-3    [facility]  [phone]  [address] [place], [place], [address]    [facility]  [phone]  [address] [place] [address]  [address] [address] [address]    [facility] (Does Rule 25 Assessments)  [email or web address]  [phone]  [address] [place] [address], [address], [place] [address]    [facility]  [email or web address]  [phone], [address], [place] [address]  [phone], [address], [place] [address]  [phone], [address] [facility], [address], [place] [address]  [phone], [address] [facility] , [address], ([facility]), [place], [place] [id number] [phone], [address] [address] [place] [address]      If you are intoxicated, you may be required to detox at a detox facility before starting treatment. The following are detox facilities that you can  self present to. All detox facilities are able to help you complete an assessment/rule 25 prior to discharge if you choose:      [facility]: [address] [place], [place], [address].         [phone]    [facility]: [address] [place], [place], [address]  [phone]    [facility]: [address] [place], [place], [address] [phone]    [facility]: [address] [place], [place], [address] [phone]            Recommendations:  It is recommended that you abstain from all mood altering chemicals. Please contact the sober support hotline (201-686-0043) as needed; phones are answered 24 hours a day, 7 days a week. Other support hotlines include:    [facility] Addiction: 6-401-662-9221    [facility] Support Line: [phone]       Ways to help cope with sobriety:    -- Take prescribed medicines as scheduled  -- Keep follow-up appointments  -- Talk to others about your concerns  -- Get regular exercise    -- Practice deep breathing skills  -- Eat a healthy diet  -- Use community resources, including hotline numbers, [facility] crisis and support meetings  -- Stay sober and avoid places/people/things associated with substance use    --Maintain a daily schedule/routine    --Get at least 7-8 hours of sleep per night    --Create a list 10--20 healthy activities that you can do that are enjoyable and do not involve substance use    --Create daily goals (approx. 1-4 goals) per day and work to achieve them throughout the day.          Free Resources:    [place] [facility] ([facility])  [facility] connects people seeking recovery to resources that help foster and sustain long-term recovery. Whether you are seeking resources for treatment, transportation, housing, job training, education, health care or other pathways to recovery, [facility] is a great place to start.    Phone: 241.709.9547. www.[employer] (Great listing of all types of recovery and non-recovery related  resources)      Alcoholics Anonymous    Phone: -800-ALCOHOL    Website: HTTP://WWW.AA.ORG/      [facility] [place] (630-002-8966 or http://a[place].org)    [facility] [place] (281-828-3226 or www.aastpaul.org)       Narcotics Anonymous    Phone: 192.578.4773    Website: www.[employer].        [employer] [address], [place], [place],    Phone: [phone]    Drop-in Hours: Monday-Friday 9-11:30 am. By appointment at other times.    Provides: [facility] is a drop-in center on the [address] [facility] that provides a safe space for individuals who are homeless and have a history of chemical use. Sobriety is not a requirement but drugs and alcohol are not allowed on the property.    Services: Non-clients can access drop-in services such as Recovery and Harm Reduction Groups, referrals to case management, community activities, shower facilities, and a pool table. Individuals who are homeless and have chemical health needs may be eligible for enrollment into [facility]'s case management program. Clients and  work together to access benefits, treatment, health care, shelter, and external housing resources.         [facility] Chemical Assessment & Referral Unit    [address] [place], [place]    Phone: [phone]    Hours: Monday-Friday 8 am-5 pm.    Provides: Rule 25 assessment and referral for individuals seeking treatment or counseling for chemical dependency. Must be a resident of [facility]. There is no fee for assessment. There is some funding available for treatment programs.

## 2024-09-01 NOTE — ED PROVIDER NOTES
"Emergency Department Encounter   NAME: Luiz Herbert ; AGE: 49 year old male ; YOB: 1975 ; MRN: [id number] ; PCP: Gerald Paredes [facility]   ED PROVIDER: Mily Obrien PA-C    Evaluation Date & Time:   No admission date for patient encounter.    CHIEF COMPLAINT:  Abdominal Pain      Impression and Plan   MDM: Luiz Herbert is a 49 year old male who presents to the ED for evaluation of vomiting and epigastric abdominal pain.  The patient presents to the emergency department for evaluation of persistent nausea and vomiting this a.m. with epigastric pain in the setting of 1 week of alcohol abuse with last drink being yesterday evening around 9 PM.  Similar presentation back in March, patient states that his symptoms feel \"exactly the same\".  Here in the ED, he is afebrile and vitally stable, blood pressure elevated to 176/126.  He is clammy and pale, has dry mucous membranes.  Tenderness across his upper belly though his abdomen remains soft without evidence of peritonitis.  No pulsatile midline masses, radiation of pain to the back, flank ecchymosis.  CT from last visit in March 2024 personally reviewed and showed no evidence of AAA -no concern for vascular insult today.  Lower suspicion of ACS, though EKG and troponin pending.  Differential includes dehydration, metabolic derangements, gastritis, withdrawal, PUD, pancreatitis, cholecystitis/choledocholithiasis, SBO.  IV fluids, Zofran, and Pepcid ordered for symptomatic relief.  Labs ordered.  Nursing staff checked blood sugar -188.  As patient feels symptoms are similar to his last presentation in March and abdominal exam non-concerning, shared medical decision making discussed deferring imaging pending lab workup and symptom control.     No symptoms concerning for upper GI bleed, no history of esophageal varices.  No chest pain concerning for esophageal perforation.  EKG shows sinus rhythm with incomplete right bundle branch " block.  Subtle ST depression in V3 nonspecific.  No acute ischemic changes.  Troponin less than 6 and given atypical symptoms, very low suspicion for ACS.  Labs notable for a mild leukocytosis of 11.5 though in the setting of persistent vomiting, suspect demargination.  Labs do suggest dehydration with a high anion gap metabolic acidosis likely starvation ketoacidosis component.  Renal function within normal limits.  Does have chronic elevation in his total and direct bilirubin, total bilirubin today of 2.1, slightly worse than previous though likely due to component of fatty liver disease seen on previous US. No gallstones on US from march.  Lipase within normal limits -low suspicion for pancreatitis.  Patient became upset with nursing staff after requesting IV Dilaudid.  He has received IV fluids, Pepcid, Zofran, morphine.  He is now tolerating p.o. at bedside and drinking water.  He is showing improvement in clinical appearance, now has good color, no longer clammy.  We discussed obtaining CT of his abdomen and pelvis again given continued pain, though continued reassuring abdominal exam. Patient declined as he does not want to wait for imaging results. He states that he would like to leave the hospital now if he is not going to receive IV dilaudid. Offered him toradol which he is agreeable to,  but would like to leave. At this time, he is clinically sober and competent to make his own medical decisions. 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  Referral to GI placed for consideration of upper endoscopy if symptoms continued, and he was encouraged to return with any new or worsening symptoms.  Advised to follow-up in his primary care clinic next week to have labs and BP rechecked, resources for alcohol cessation provided.  At this time not showing any signs of significant withdrawal.  Discharged in stable  condition.    *Patient examination and workup was initiated in triage due to inpatient hospital and Emergency Department bed shortage resulting in long waiting room wait times. Patient and/or guardian's consent was obtained.     Medical Decision Making  Obtained supplemental history:Supplemental history obtained?: No  Reviewed external records: External records reviewed?: Other: ED visits on 3/21/2024 and 3/23/2024 including CT and US from visits  Care impacted by chronic illness:Hypertension and Other: Alcohol abuse  Care significantly affected by social determinants of health:Alcohol Abuse and/or Recreational Drug Use  Did you consider but not order tests?: Work up considered but not performed and documented in chart, if applicable  Did you interpret images independently?: Independent interpretation of ECG and images noted in documentation, when applicable.  Consultation discussion with other provider:Did you involve another provider (consultant, , pharmacy, etc.)?: No  Discharge. I prescribed additional prescription strength medication(s) as charted. See documentation for any additional details.    ED COURSE:  12:53 PM I met and introduced myself to the patient. I gathered initial history and performed my physical exam. We discussed plan for initial workup.   1:14 PM I have staffed the patient with Dr. Alvarez, ED MD, who has evaluated the patient and agrees with all aspects of today's care.   3:51 PM I rechecked the patient and discussed results, discharge, follow up, and reasons to return to the ED.     At the conclusion of the encounter I discussed the results of all the tests and the disposition. The questions were answered. The patient or family acknowledged understanding and was agreeable with the care plan.    FINAL IMPRESSION:    ICD-10-CM    1. Epigastric pain  R10.13 Adult GI  Referral - Consult Only      2. Nausea and vomiting  R11.2 Adult GI  Referral - Consult Only      3.  "Dehydration  E86.0       4. Alcohol abuse  F10.10             MEDICATIONS GIVEN IN THE EMERGENCY DEPARTMENT:  Medications   ketorolac (TORADOL) injection 15 mg (has no administration in time range)   sodium chloride 0.9% BOLUS 1,000 mL (1,000 mLs Intravenous $New Bag 9/1/24 1352)   famotidine (PEPCID) injection 20 mg (20 mg Intravenous $Given 9/1/24 1334)   ondansetron (ZOFRAN) injection 4 mg (4 mg Intravenous $Given 9/1/24 1333)   morphine (PF) injection 4 mg (4 mg Intravenous $Given 9/1/24 1351)   alum & mag hydroxide-simethicone (MAALOX) suspension 15 mL (15 mLs Oral $Given 9/1/24 1413)   sodium chloride 0.9% BOLUS 1,000 mL (1,000 mLs Intravenous $New Bag 9/1/24 1446)   alum & mag hydroxide-simethicone (MAALOX) suspension 15 mL (15 mLs Oral $Given 9/1/24 1512)         NEW PRESCRIPTIONS STARTED AT TODAY'S ED VISIT:  New Prescriptions    ALUM & MAG HYDROXIDE-SIMETHICONE (MAALOX MAX) 400-400-40 MG/5ML SUSP SUSPENSION    Take 30 mLs by mouth every 4 hours as needed for indigestion.    ONDANSETRON (ZOFRAN ODT) 4 MG ODT TAB    Take 1 tablet (4 mg) by mouth every 8 hours as needed for nausea.         HPI   Use of Intrepreter: N/A     Luiz Herbert is a 49 year old male with a pertinent history of hypertension, major depressive disorder, hypercholesteremia, heartburn, osteoarthritis, who presents to the ED by private vehicle for evaluation of epigastric pain and vomiting.    Per patient, he states that he is a binge drinker, and recently lost his mother, causing him to drink excessively over the past week.  States that he had around 12 beers yesterday with last drink being last night.  Woke up this morning with epigastric abdominal pain, nausea, vomiting and dry heaving.  He states that his pain and symptoms feel \"exactly the same\" to his last ED visit in March and states \"I know this is my ulcer problem\".  No blood in his vomit or coffee-ground appearance.  He has been having normal bowel movements without melena.  " "He has not had any previous abdominal surgeries.  No pain that radiates to his back or urinary symptoms.  No recent fevers or chills.  No radiation of pain into his chest.  He states that he does feel short of breath due to the pain and states that he is currently very anxious and that he had similar shortness of breath during his last ED visit. No hx of withdrawal seizures or admissions per patient.       REVIEW OF SYSTEMS:  Pertinent positive and negative symptoms per HPI.       Medical History     Past Medical History:   Diagnosis Date    Anemia     Anxiety     Depressive disorder     Essential familial hypercholesterolemia        No past surgical history on file.    No family history on file.         alum & mag hydroxide-simethicone (MAALOX MAX) 400-400-40 MG/5ML SUSP suspension  ondansetron (ZOFRAN ODT) 4 MG ODT tab  cyclobenzaprine (FLEXERIL) 10 MG tablet  escitalopram oxalate (LEXAPRO) 10 MG tablet  ondansetron (ZOFRAN ODT) 4 MG ODT tab  sucralfate (CARAFATE) 1 GM/10ML suspension          Physical Exam     First Vitals:  Patient Vitals for the past 24 hrs:   BP Temp Pulse Resp SpO2 Height Weight   09/01/24 1400 (!) 194/108 -- 61 16 100 % -- --   09/01/24 1238 (!) 176/126 97.2  F (36.2  C) 74 18 98 % 1.778 m (5' 10\") 80.6 kg (177 lb 9.6 oz)         PHYSICAL EXAM:   Physical Exam  Vitals reviewed.   Constitutional:       Appearance: He is not toxic-appearing.      Comments: Clammy and pale.    HENT:      Mouth/Throat:      Comments: Dry mucous membranes.   Cardiovascular:      Rate and Rhythm: Normal rate and regular rhythm.      Heart sounds: Normal heart sounds.   Pulmonary:      Effort: Pulmonary effort is normal.      Breath sounds: Normal breath sounds. No wheezing, rhonchi or rales.   Abdominal:      General: Abdomen is flat. Bowel sounds are normal. There is no distension.      Palpations: Abdomen is soft.      Tenderness: There is abdominal tenderness (diffuse across upper abdomen). There is no right " CVA tenderness, left CVA tenderness, guarding or rebound.      Comments: No pulsatile midline masses.    Neurological:      Mental Status: He is alert and oriented to person, place, and time.      Comments: No tremors.    Psychiatric:      Comments: No hallucinations.              Results     LAB:  All pertinent labs reviewed and interpreted  Labs Ordered and Resulted from Time of ED Arrival to Time of ED Departure   CBC WITH PLATELETS - Abnormal       Result Value    WBC Count 11.5 (*)     RBC Count 4.71      Hemoglobin 15.0      Hematocrit 41.6      MCV 88      MCH 31.8      MCHC 36.1      RDW 12.6      Platelet Count 275     BASIC METABOLIC PANEL - Abnormal    Sodium 138      Potassium 3.6      Chloride 97 (*)     Carbon Dioxide (CO2) 16 (*)     Anion Gap 25 (*)     Urea Nitrogen 19.4      Creatinine 1.03      GFR Estimate 88      Calcium 10.8 (*)     Glucose 178 (*)    HEPATIC FUNCTION PANEL - Abnormal    Protein Total 8.3      Albumin 4.9      Bilirubin Total 2.1 (*)     Alkaline Phosphatase 87      AST 35      ALT 28      Bilirubin Direct 0.43 (*)    GLUCOSE BY METER - Abnormal    GLUCOSE BY METER POCT 188 (*)    LIPASE - Normal    Lipase 43     MAGNESIUM - Normal    Magnesium 1.8     TROPONIN T, HIGH SENSITIVITY - Normal    Troponin T, High Sensitivity <6     GLUCOSE MONITOR NURSING POCT       RADIOLOGY:  No orders to display         ECG:  Performed at: 12:42 PM    Impression: Sinus rhythm    Rate: 88 BPM  Rhythm: Sinus rhythm  Axis: -52  AZ Interval: 134 ms  QRS Interval:  96 ms  QTc Interval: 474 ms  ST Changes:  Junctional ST depression probably normal   Comparison: When compared with ECG of 23-Mar-2024, incomplete right bundle branch block is now present.     EKG results reviewed and interpreted by Dr. Alvarez, ED MD.       Mily Obrien PA-C   Emergency Medicine   [facility] EMERGENCY ROOM       Mily Obrien PA-C  09/01/24 0717

## 2024-09-01 NOTE — ED PROVIDER NOTES
"Emergency Department Midlevel Supervisory Note     I had a face to face encounter with this patient seen by the Advanced Practice Provider (GERMAIN). I personally made/approved the management plan and take responsibility for the patient management. I personally saw patient and performed a substantive portion of the visit including all aspects of the medical decision making.     ED Course:  1:10 PM  Jennifer Obrien PA-C staffed patient with me. I agree with their assessment and plan of management, and I will see the patient.  2:06 PM I met with the patient to introduce myself, gather additional history, perform my initial exam, and discuss the plan.     Brief HPI:     Luiz Herbert is a 49 year old male who presents for evaluation of abdominal pain.     The patient reports abdominal pain that began this morning along with nausea and vomiting. He had multiple episodes of vomiting and states \"its all bile now\". She also endorses shortness of breath and chills. Denies any diarrhea. No other complaints at this time.     I, Roxana Schmidt, am serving as a scribe to document services personally performed by Carlos Alvarez MD, based on my observations and the provider's statements to me. I, Carlos Alvarez MD attest that Roxana Schmidt was acting in a scribe capacity, has observed my performance of the services and has documented them in accordance with my direction.    Brief Physical Exam: BP (!) 176/88   Pulse 62   Temp 98.3  F (36.8  C) (Oral)   Resp 16   Ht 1.778 m (5' 10\")   Wt 80.6 kg (177 lb 9.6 oz)   SpO2 100%   BMI 25.48 kg/m    Constitutional:  Alert, in no acute distress  EYES: Conjunctivae clear  HENT:  Atraumatic  Respiratory:  Respirations even, unlabored, in no acute respiratory distress  Cardiovascular:  Regular rate and rhythm, good peripheral perfusion  GI: Soft, non-distended, non-tender  Musculoskeletal:  Moves all 4 extremities equally, grossly symmetrical strength  Integument: Warm & dry. No appreciable " rash, erythema.  Neurologic:  Alert & oriented, speech clear and fluent, no focal deficits noted  Psych: Normal mood and affect       MDM:  Patient presenting with abdominal pain.  Reportedly had similar episode previously that he states is similar.   is well-appearing and nontoxic and is mildly hypertensive but otherwise hemodynamically stable breathing comfortably on room air.  His abdominal exam is soft nontender on my exam.  Plan for broad laboratory workup as well as symptomatic treatment for gastritis and IV fluids.  We did consider a CT scan and offer this to him however since he had previous similar episode that showed concern for gastritis he deferred CT scan at that time.  Plan for symptomatic treatment and likely discharge.       1. Epigastric pain    2. Nausea and vomiting    3. Dehydration    4. Alcohol abuse          Labs and Imaging:  Results for orders placed or performed during the hospital encounter of 09/01/24   CBC (+ platelets, no diff)   Result Value Ref Range    WBC Count 11.5 (H) 4.0 - 11.0 10e3/uL    RBC Count 4.71 4.40 - 5.90 10e6/uL    Hemoglobin 15.0 13.3 - 17.7 g/dL    Hematocrit 41.6 40.0 - 53.0 %    MCV 88 78 - 100 fL    MCH 31.8 26.5 - 33.0 pg    MCHC 36.1 31.5 - 36.5 g/dL    RDW 12.6 10.0 - 15.0 %    Platelet Count 275 150 - 450 10e3/uL   Basic metabolic panel   Result Value Ref Range    Sodium 138 135 - 145 mmol/L    Potassium 3.6 3.4 - 5.3 mmol/L    Chloride 97 (L) 98 - 107 mmol/L    Carbon Dioxide (CO2) 16 (L) 22 - 29 mmol/L    Anion Gap 25 (H) 7 - 15 mmol/L    Urea Nitrogen 19.4 6.0 - 20.0 mg/dL    Creatinine 1.03 0.67 - 1.17 mg/dL    GFR Estimate 88 >60 mL/min/1.73m2    Calcium 10.8 (H) 8.8 - 10.4 mg/dL    Glucose 178 (H) 70 - 99 mg/dL   Hepatic function panel   Result Value Ref Range    Protein Total 8.3 6.4 - 8.3 g/dL    Albumin 4.9 3.5 - 5.2 g/dL    Bilirubin Total 2.1 (H) <=1.2 mg/dL    Alkaline Phosphatase 87 40 - 150 U/L    AST 35 0 - 45 U/L    ALT 28 0 - 70 U/L     Bilirubin Direct 0.43 (H) 0.00 - 0.30 mg/dL   Result Value Ref Range    Lipase 43 13 - 60 U/L   Result Value Ref Range    Magnesium 1.8 1.7 - 2.3 mg/dL   Troponin T, High Sensitivity (now)   Result Value Ref Range    Troponin T, High Sensitivity <6 <=22 ng/L   Glucose by meter   Result Value Ref Range    GLUCOSE BY METER POCT 188 (H) 70 - 99 mg/dL   Extra Blue Top Tube   Result Value Ref Range    Hold Specimen JIC    Extra Red Top Tube   Result Value Ref Range    Hold Specimen JIC    ECG 12-LEAD WITH MUSE (LHE)   Result Value Ref Range    Systolic Blood Pressure  mmHg    Diastolic Blood Pressure  mmHg    Ventricular Rate 88 BPM    Atrial Rate 88 BPM    IL Interval 134 ms    QRS Duration 96 ms     ms    QTc 474 ms    P Axis 11 degrees    R AXIS -52 degrees    T Axis 10 degrees    Interpretation ECG       Sinus rhythm  Incomplete right bundle branch block  Left anterior fascicular block  Junctional ST depression, probably normal  Abnormal ECG  When compared with ECG of 23-Mar-2024 08:51,  Incomplete right bundle branch block is now Present  Confirmed by SEE ED PROVIDER NOTE FOR, ECG INTERPRETATION ([id number]),  Aury Cabezas ([id number]) on 9/1/2024 1:45:43 PM         I have reviewed the relevant laboratory studies above.      Procedures:  I was present for the key portions of procedures documented in GERMAIN/midlevel note, see midlevel note for further details.    Carlos Alvarez MD  [facility] EMERGENCY ROOM  [address] [address] [phone]       Carlos Alvarez MD  09/02/24 1007

## 2024-09-05 ENCOUNTER — TELEPHONE (OUTPATIENT)
Dept: GASTROENTEROLOGY | Facility: CLINIC | Age: 49
End: 2024-09-05

## 2024-09-05 NOTE — TELEPHONE ENCOUNTER
[facility] Call Center    Phone Message    May a detailed message be left on voicemail: Yes    Reason for Call: Other: Patient is currently scheduled on 11/13/2024, as visit type New GI Urgent. This is outside the expected timeline for this referral. Patient has been added to the waitlist.      Action Taken: Message routed to:  Other: GI REFERRAL TRIAGE POOL     Travel Screening: Not Applicable

## 2024-10-15 NOTE — TELEPHONE ENCOUNTER
REFERRAL INFORMATION:  Referring Provider:  Mily Obrien PA-C   Referring Clinic:  [facility] EMERGENCY DEPT   Reason for Visit/Diagnosis:   R10.13 (ICD-10-CM) - Epigastric pain   R11.2 (ICD-10-CM) - Nausea and vomiting        FUTURE VISIT INFORMATION:  Appointment Date: 11/13/24  Appointment Time:      NOTES STATUS DETAILS   OFFICE NOTE from Referring Provider Internal ED 9/1/24   OFFICE NOTE from Other Specialist Care Everywhere 3/21/24-Annam[facility] Estes [facility]    [facility]  ED VISITS Internal ED 3/23/24-Dr. Arrington[facility]    ED 3/21/24-Khadijah Puente PA-C-[facility]    [facility]   OPERATIVE REPORT N/A    MEDICATION LIST Internal         ENDOSCOPY  N/A    COLONOSCOPY N/A    ERCP N/A    EUS N/A    STOOL TESTING N/A    PERTINENT LABS Internal    PATHOLOGY REPORTS (RELATED) N/A    IMAGING (CT, MRI, EGD, MRCP, Small Bowel Follow Through/SBT, MR/CT Enterography) Internal 3/23/24, 10/24/21-US abdomen  3/21/24, 2/12/22, 10/24/21-Ct abd pel

## 2024-11-13 ENCOUNTER — VIRTUAL VISIT (OUTPATIENT)
Dept: GASTROENTEROLOGY | Facility: CLINIC | Age: 49
End: 2024-11-13
Attending: PHYSICIAN ASSISTANT
Payer: COMMERCIAL

## 2024-11-13 DIAGNOSIS — Z53.9 NO SHOW: Primary | ICD-10-CM

## 2024-11-13 NOTE — LETTER
11/13/2024      Luiz Herbert  [address] [address]      Dear Colleague,    Thank you for referring your patient, Luiz Herbert, to the [facility] [place]. Please see a copy of my visit note below.    No show to scheduled visit      Again, thank you for allowing me to participate in the care of your patient.        Sincerely,        Bridgette Dominguez PA-C

## 2025-01-15 ENCOUNTER — HOSPITAL ENCOUNTER (INPATIENT)
Facility: CLINIC | Age: 50
DRG: 896 | End: 2025-01-15
Attending: EMERGENCY MEDICINE | Admitting: INTERNAL MEDICINE
Payer: COMMERCIAL

## 2025-01-15 DIAGNOSIS — R56.9 ALCOHOL WITHDRAWAL SEIZURE WITH COMPLICATION (H): ICD-10-CM

## 2025-01-15 DIAGNOSIS — R59.1 LYMPHADENOPATHY: Primary | ICD-10-CM

## 2025-01-15 DIAGNOSIS — K85.20 ALCOHOL-INDUCED ACUTE PANCREATITIS, UNSPECIFIED COMPLICATION STATUS: ICD-10-CM

## 2025-01-15 DIAGNOSIS — F10.939 ALCOHOL WITHDRAWAL SEIZURE WITH COMPLICATION (H): ICD-10-CM

## 2025-01-15 DIAGNOSIS — N17.9 ACUTE KIDNEY INJURY: ICD-10-CM

## 2025-01-15 DIAGNOSIS — I10 PRIMARY HYPERTENSION: ICD-10-CM

## 2025-01-15 DIAGNOSIS — E87.20 METABOLIC ACIDOSIS: ICD-10-CM

## 2025-01-15 DIAGNOSIS — F10.90 ALCOHOL USE DISORDER: ICD-10-CM

## 2025-01-15 LAB
ATRIAL RATE - MUSE: 113 BPM
DIASTOLIC BLOOD PRESSURE - MUSE: 102 MMHG
ERYTHROCYTE [DISTWIDTH] IN BLOOD BY AUTOMATED COUNT: 14.1 % (ref 10–15)
HCT VFR BLD AUTO: 52 % (ref 40–53)
HGB BLD-MCNC: 19.4 G/DL (ref 13.3–17.7)
INTERPRETATION ECG - MUSE: NORMAL
MCH RBC QN AUTO: 32.7 PG (ref 26.5–33)
MCHC RBC AUTO-ENTMCNC: 37.3 G/DL (ref 31.5–36.5)
MCV RBC AUTO: 88 FL (ref 78–100)
P AXIS - MUSE: 34 DEGREES
PLATELET # BLD AUTO: 229 10E3/UL (ref 150–450)
PR INTERVAL - MUSE: 146 MS
QRS DURATION - MUSE: 96 MS
QT - MUSE: 364 MS
QTC - MUSE: 499 MS
R AXIS - MUSE: -81 DEGREES
RBC # BLD AUTO: 5.94 10E6/UL (ref 4.4–5.9)
SYSTOLIC BLOOD PRESSURE - MUSE: 165 MMHG
T AXIS - MUSE: 76 DEGREES
VENTRICULAR RATE- MUSE: 113 BPM
WBC # BLD AUTO: 15.4 10E3/UL (ref 4–11)

## 2025-01-15 PROCEDURE — 84460 ALANINE AMINO (ALT) (SGPT): CPT | Performed by: EMERGENCY MEDICINE

## 2025-01-15 PROCEDURE — 83690 ASSAY OF LIPASE: CPT | Performed by: EMERGENCY MEDICINE

## 2025-01-15 PROCEDURE — 83735 ASSAY OF MAGNESIUM: CPT | Performed by: EMERGENCY MEDICINE

## 2025-01-15 PROCEDURE — 96375 TX/PRO/DX INJ NEW DRUG ADDON: CPT

## 2025-01-15 PROCEDURE — 36415 COLL VENOUS BLD VENIPUNCTURE: CPT | Performed by: EMERGENCY MEDICINE

## 2025-01-15 PROCEDURE — 93005 ELECTROCARDIOGRAM TRACING: CPT | Performed by: EMERGENCY MEDICINE

## 2025-01-15 PROCEDURE — 250N000009 HC RX 250: Performed by: EMERGENCY MEDICINE

## 2025-01-15 PROCEDURE — 85041 AUTOMATED RBC COUNT: CPT | Performed by: EMERGENCY MEDICINE

## 2025-01-15 PROCEDURE — HZ2ZZZZ DETOXIFICATION SERVICES FOR SUBSTANCE ABUSE TREATMENT: ICD-10-PCS | Performed by: FAMILY MEDICINE

## 2025-01-15 PROCEDURE — 80048 BASIC METABOLIC PNL TOTAL CA: CPT | Performed by: EMERGENCY MEDICINE

## 2025-01-15 PROCEDURE — 85014 HEMATOCRIT: CPT | Performed by: EMERGENCY MEDICINE

## 2025-01-15 PROCEDURE — 96361 HYDRATE IV INFUSION ADD-ON: CPT

## 2025-01-15 PROCEDURE — 250N000011 HC RX IP 250 OP 636: Performed by: EMERGENCY MEDICINE

## 2025-01-15 PROCEDURE — 84155 ASSAY OF PROTEIN SERUM: CPT | Performed by: EMERGENCY MEDICINE

## 2025-01-15 PROCEDURE — 99285 EMERGENCY DEPT VISIT HI MDM: CPT | Mod: 25

## 2025-01-15 PROCEDURE — 258N000003 HC RX IP 258 OP 636: Performed by: EMERGENCY MEDICINE

## 2025-01-15 PROCEDURE — 96374 THER/PROPH/DIAG INJ IV PUSH: CPT

## 2025-01-15 PROCEDURE — 82077 ASSAY SPEC XCP UR&BREATH IA: CPT | Performed by: EMERGENCY MEDICINE

## 2025-01-15 RX ORDER — ONDANSETRON 2 MG/ML
4 INJECTION INTRAMUSCULAR; INTRAVENOUS ONCE
Status: COMPLETED | OUTPATIENT
Start: 2025-01-15 | End: 2025-01-15

## 2025-01-15 RX ORDER — DIAZEPAM 10 MG/2ML
5 INJECTION, SOLUTION INTRAMUSCULAR; INTRAVENOUS ONCE
Status: COMPLETED | OUTPATIENT
Start: 2025-01-15 | End: 2025-01-15

## 2025-01-15 RX ADMIN — PANTOPRAZOLE SODIUM 40 MG: 40 INJECTION, POWDER, FOR SOLUTION INTRAVENOUS at 23:41

## 2025-01-15 RX ADMIN — DIAZEPAM 5 MG: 5 INJECTION, SOLUTION INTRAMUSCULAR; INTRAVENOUS at 23:36

## 2025-01-15 RX ADMIN — ONDANSETRON 4 MG: 2 INJECTION, SOLUTION INTRAMUSCULAR; INTRAVENOUS at 23:39

## 2025-01-15 RX ADMIN — SODIUM CHLORIDE, POTASSIUM CHLORIDE, SODIUM LACTATE AND CALCIUM CHLORIDE 1000 ML: 600; 310; 30; 20 INJECTION, SOLUTION INTRAVENOUS at 23:37

## 2025-01-15 ASSESSMENT — COLUMBIA-SUICIDE SEVERITY RATING SCALE - C-SSRS
1. IN THE PAST MONTH, HAVE YOU WISHED YOU WERE DEAD OR WISHED YOU COULD GO TO SLEEP AND NOT WAKE UP?: NO
6. HAVE YOU EVER DONE ANYTHING, STARTED TO DO ANYTHING, OR PREPARED TO DO ANYTHING TO END YOUR LIFE?: NO
2. HAVE YOU ACTUALLY HAD ANY THOUGHTS OF KILLING YOURSELF IN THE PAST MONTH?: NO

## 2025-01-15 ASSESSMENT — ACTIVITIES OF DAILY LIVING (ADL): ADLS_ACUITY_SCORE: 41

## 2025-01-16 ENCOUNTER — APPOINTMENT (OUTPATIENT)
Dept: CT IMAGING | Facility: CLINIC | Age: 50
DRG: 896 | End: 2025-01-16
Attending: EMERGENCY MEDICINE
Payer: COMMERCIAL

## 2025-01-16 ENCOUNTER — APPOINTMENT (OUTPATIENT)
Dept: ULTRASOUND IMAGING | Facility: CLINIC | Age: 50
DRG: 896 | End: 2025-01-16
Attending: INTERNAL MEDICINE
Payer: COMMERCIAL

## 2025-01-16 VITALS
WEIGHT: 165 LBS | DIASTOLIC BLOOD PRESSURE: 89 MMHG | HEART RATE: 93 BPM | HEIGHT: 70 IN | RESPIRATION RATE: 18 BRPM | TEMPERATURE: 97.9 F | OXYGEN SATURATION: 97 % | SYSTOLIC BLOOD PRESSURE: 124 MMHG | BODY MASS INDEX: 23.62 KG/M2

## 2025-01-16 PROBLEM — N17.9 ACUTE KIDNEY INJURY: Status: ACTIVE | Noted: 2025-01-16

## 2025-01-16 PROBLEM — F10.939 ALCOHOL WITHDRAWAL SEIZURE WITH COMPLICATION (H): Status: ACTIVE | Noted: 2025-01-16

## 2025-01-16 PROBLEM — R56.9 ALCOHOL WITHDRAWAL SEIZURE WITH COMPLICATION (H): Status: ACTIVE | Noted: 2025-01-16

## 2025-01-16 PROBLEM — F10.90 ALCOHOL USE DISORDER: Status: ACTIVE | Noted: 2024-01-25

## 2025-01-16 PROBLEM — E87.20 METABOLIC ACIDOSIS: Status: ACTIVE | Noted: 2025-01-16

## 2025-01-16 PROBLEM — I10 HYPERTENSION: Status: ACTIVE | Noted: 2024-01-25

## 2025-01-16 PROBLEM — K85.20 ALCOHOL-INDUCED ACUTE PANCREATITIS, UNSPECIFIED COMPLICATION STATUS: Status: ACTIVE | Noted: 2025-01-16

## 2025-01-16 LAB
ALBUMIN SERPL BCG-MCNC: 3.3 G/DL (ref 3.5–5.2)
ALBUMIN SERPL BCG-MCNC: 3.9 G/DL (ref 3.5–5.2)
ALP SERPL-CCNC: 129 U/L (ref 40–150)
ALP SERPL-CCNC: 155 U/L (ref 40–150)
ALT SERPL W P-5'-P-CCNC: 159 U/L (ref 0–70)
ALT SERPL W P-5'-P-CCNC: 206 U/L (ref 0–70)
ANION GAP SERPL CALCULATED.3IONS-SCNC: 21 MMOL/L (ref 7–15)
ANION GAP SERPL CALCULATED.3IONS-SCNC: 24 MMOL/L (ref 7–15)
ANION GAP SERPL CALCULATED.3IONS-SCNC: 29 MMOL/L (ref 7–15)
AST SERPL W P-5'-P-CCNC: 187 U/L (ref 0–45)
AST SERPL W P-5'-P-CCNC: ABNORMAL U/L
B-OH-BUTYR SERPL-SCNC: 0.61 MMOL/L
BILIRUB DIRECT SERPL-MCNC: 0.86 MG/DL (ref 0–0.3)
BILIRUB DIRECT SERPL-MCNC: ABNORMAL MG/DL
BILIRUB SERPL-MCNC: 2.2 MG/DL
BILIRUB SERPL-MCNC: 2.5 MG/DL
BUN SERPL-MCNC: 29.5 MG/DL (ref 6–20)
BUN SERPL-MCNC: 29.9 MG/DL (ref 6–20)
BUN SERPL-MCNC: 30.6 MG/DL (ref 6–20)
CALCIUM SERPL-MCNC: 8.2 MG/DL (ref 8.8–10.4)
CALCIUM SERPL-MCNC: 8.6 MG/DL (ref 8.8–10.4)
CALCIUM SERPL-MCNC: 9.3 MG/DL (ref 8.8–10.4)
CHLORIDE SERPL-SCNC: 82 MMOL/L (ref 98–107)
CHLORIDE SERPL-SCNC: 87 MMOL/L (ref 98–107)
CHLORIDE SERPL-SCNC: 90 MMOL/L (ref 98–107)
CREAT SERPL-MCNC: 1.91 MG/DL (ref 0.67–1.17)
CREAT SERPL-MCNC: 2.02 MG/DL (ref 0.67–1.17)
CREAT SERPL-MCNC: 2.5 MG/DL (ref 0.67–1.17)
EGFRCR SERPLBLD CKD-EPI 2021: 31 ML/MIN/1.73M2
EGFRCR SERPLBLD CKD-EPI 2021: 39 ML/MIN/1.73M2
EGFRCR SERPLBLD CKD-EPI 2021: 42 ML/MIN/1.73M2
ERYTHROCYTE [DISTWIDTH] IN BLOOD BY AUTOMATED COUNT: 14 % (ref 10–15)
EST. AVERAGE GLUCOSE BLD GHB EST-MCNC: 126 MG/DL
ETHANOL SERPL-MCNC: <0.01 G/DL
GLUCOSE SERPL-MCNC: 168 MG/DL (ref 70–99)
GLUCOSE SERPL-MCNC: 185 MG/DL (ref 70–99)
GLUCOSE SERPL-MCNC: 244 MG/DL (ref 70–99)
HBA1C MFR BLD: 6 %
HCO3 SERPL-SCNC: 16 MMOL/L (ref 22–29)
HCO3 SERPL-SCNC: 17 MMOL/L (ref 22–29)
HCO3 SERPL-SCNC: 18 MMOL/L (ref 22–29)
HCT VFR BLD AUTO: 49.3 % (ref 40–53)
HGB BLD-MCNC: 18 G/DL (ref 13.3–17.7)
LIPASE SERPL-CCNC: 1007 U/L (ref 13–60)
LIPASE SERPL-CCNC: 2078 U/L (ref 13–60)
MAGNESIUM SERPL-MCNC: 2.4 MG/DL (ref 1.7–2.3)
MCH RBC QN AUTO: 32 PG (ref 26.5–33)
MCHC RBC AUTO-ENTMCNC: 36.5 G/DL (ref 31.5–36.5)
MCV RBC AUTO: 88 FL (ref 78–100)
PHOSPHATE SERPL-MCNC: 6 MG/DL (ref 2.5–4.5)
PLATELET # BLD AUTO: 183 10E3/UL (ref 150–450)
POTASSIUM SERPL-SCNC: 3.7 MMOL/L (ref 3.4–5.3)
POTASSIUM SERPL-SCNC: 4.1 MMOL/L (ref 3.4–5.3)
POTASSIUM SERPL-SCNC: 4.4 MMOL/L (ref 3.4–5.3)
PROT SERPL-MCNC: 6.8 G/DL (ref 6.4–8.3)
PROT SERPL-MCNC: 7.9 G/DL (ref 6.4–8.3)
RBC # BLD AUTO: 5.62 10E6/UL (ref 4.4–5.9)
SODIUM SERPL-SCNC: 127 MMOL/L (ref 135–145)
SODIUM SERPL-SCNC: 128 MMOL/L (ref 135–145)
SODIUM SERPL-SCNC: 129 MMOL/L (ref 135–145)
WBC # BLD AUTO: 13.3 10E3/UL (ref 4–11)

## 2025-01-16 PROCEDURE — 258N000003 HC RX IP 258 OP 636: Performed by: EMERGENCY MEDICINE

## 2025-01-16 PROCEDURE — 82565 ASSAY OF CREATININE: CPT | Performed by: EMERGENCY MEDICINE

## 2025-01-16 PROCEDURE — 84520 ASSAY OF UREA NITROGEN: CPT | Performed by: INTERNAL MEDICINE

## 2025-01-16 PROCEDURE — 250N000013 HC RX MED GY IP 250 OP 250 PS 637: Performed by: INTERNAL MEDICINE

## 2025-01-16 PROCEDURE — 84155 ASSAY OF PROTEIN SERUM: CPT | Performed by: INTERNAL MEDICINE

## 2025-01-16 PROCEDURE — 258N000003 HC RX IP 258 OP 636: Performed by: INTERNAL MEDICINE

## 2025-01-16 PROCEDURE — 250N000013 HC RX MED GY IP 250 OP 250 PS 637: Performed by: EMERGENCY MEDICINE

## 2025-01-16 PROCEDURE — 85018 HEMOGLOBIN: CPT | Performed by: INTERNAL MEDICINE

## 2025-01-16 PROCEDURE — 36415 COLL VENOUS BLD VENIPUNCTURE: CPT | Performed by: EMERGENCY MEDICINE

## 2025-01-16 PROCEDURE — 250N000011 HC RX IP 250 OP 636: Performed by: EMERGENCY MEDICINE

## 2025-01-16 PROCEDURE — 83690 ASSAY OF LIPASE: CPT | Performed by: INTERNAL MEDICINE

## 2025-01-16 PROCEDURE — 120N000004 HC R&B MS OVERFLOW

## 2025-01-16 PROCEDURE — 250N000011 HC RX IP 250 OP 636: Performed by: INTERNAL MEDICINE

## 2025-01-16 PROCEDURE — 99207 PR NO CHARGE LOS: CPT | Performed by: INTERNAL MEDICINE

## 2025-01-16 PROCEDURE — 74176 CT ABD & PELVIS W/O CONTRAST: CPT

## 2025-01-16 PROCEDURE — 82010 KETONE BODYS QUAN: CPT | Performed by: INTERNAL MEDICINE

## 2025-01-16 PROCEDURE — 83036 HEMOGLOBIN GLYCOSYLATED A1C: CPT | Performed by: INTERNAL MEDICINE

## 2025-01-16 PROCEDURE — 84460 ALANINE AMINO (ALT) (SGPT): CPT | Performed by: INTERNAL MEDICINE

## 2025-01-16 PROCEDURE — 85041 AUTOMATED RBC COUNT: CPT | Performed by: INTERNAL MEDICINE

## 2025-01-16 PROCEDURE — 76705 ECHO EXAM OF ABDOMEN: CPT

## 2025-01-16 PROCEDURE — 80048 BASIC METABOLIC PNL TOTAL CA: CPT | Performed by: EMERGENCY MEDICINE

## 2025-01-16 PROCEDURE — 36415 COLL VENOUS BLD VENIPUNCTURE: CPT | Performed by: INTERNAL MEDICINE

## 2025-01-16 PROCEDURE — 250N000009 HC RX 250: Performed by: INTERNAL MEDICINE

## 2025-01-16 PROCEDURE — 96361 HYDRATE IV INFUSION ADD-ON: CPT

## 2025-01-16 PROCEDURE — 80048 BASIC METABOLIC PNL TOTAL CA: CPT | Performed by: INTERNAL MEDICINE

## 2025-01-16 PROCEDURE — 250N000011 HC RX IP 250 OP 636: Performed by: STUDENT IN AN ORGANIZED HEALTH CARE EDUCATION/TRAINING PROGRAM

## 2025-01-16 PROCEDURE — 96374 THER/PROPH/DIAG INJ IV PUSH: CPT

## 2025-01-16 PROCEDURE — 99222 1ST HOSP IP/OBS MODERATE 55: CPT | Performed by: INTERNAL MEDICINE

## 2025-01-16 RX ORDER — HYDROMORPHONE HYDROCHLORIDE 1 MG/ML
0.5 INJECTION, SOLUTION INTRAMUSCULAR; INTRAVENOUS; SUBCUTANEOUS ONCE
Status: COMPLETED | OUTPATIENT
Start: 2025-01-16 | End: 2025-01-16

## 2025-01-16 RX ORDER — HYDRALAZINE HYDROCHLORIDE 10 MG/1
10 TABLET, FILM COATED ORAL EVERY 4 HOURS PRN
Status: DISCONTINUED | OUTPATIENT
Start: 2025-01-16 | End: 2025-01-17

## 2025-01-16 RX ORDER — DIAZEPAM 5 MG/1
10 TABLET ORAL EVERY 30 MIN PRN
Status: DISCONTINUED | OUTPATIENT
Start: 2025-01-16 | End: 2025-01-19 | Stop reason: HOSPADM

## 2025-01-16 RX ORDER — FAMOTIDINE 20 MG/1
20 TABLET, FILM COATED ORAL 2 TIMES DAILY
Status: DISCONTINUED | OUTPATIENT
Start: 2025-01-16 | End: 2025-01-16

## 2025-01-16 RX ORDER — PROCHLORPERAZINE MALEATE 10 MG
10 TABLET ORAL EVERY 6 HOURS PRN
Status: DISCONTINUED | OUTPATIENT
Start: 2025-01-16 | End: 2025-01-19 | Stop reason: HOSPADM

## 2025-01-16 RX ORDER — HYDRALAZINE HYDROCHLORIDE 20 MG/ML
10 INJECTION INTRAMUSCULAR; INTRAVENOUS EVERY 4 HOURS PRN
Status: DISCONTINUED | OUTPATIENT
Start: 2025-01-16 | End: 2025-01-17

## 2025-01-16 RX ORDER — OMEPRAZOLE 20 MG/1
20 TABLET, DELAYED RELEASE ORAL DAILY
COMMUNITY

## 2025-01-16 RX ORDER — FAMOTIDINE 20 MG/1
20 TABLET, FILM COATED ORAL DAILY
Status: DISCONTINUED | OUTPATIENT
Start: 2025-01-17 | End: 2025-01-17

## 2025-01-16 RX ORDER — AMOXICILLIN 250 MG
1 CAPSULE ORAL 2 TIMES DAILY PRN
Status: DISCONTINUED | OUTPATIENT
Start: 2025-01-16 | End: 2025-01-19 | Stop reason: HOSPADM

## 2025-01-16 RX ORDER — FENTANYL CITRATE 50 UG/ML
25 INJECTION, SOLUTION INTRAMUSCULAR; INTRAVENOUS
Status: DISCONTINUED | OUTPATIENT
Start: 2025-01-16 | End: 2025-01-16

## 2025-01-16 RX ORDER — ONDANSETRON 4 MG/1
4 TABLET, ORALLY DISINTEGRATING ORAL EVERY 6 HOURS PRN
Status: DISCONTINUED | OUTPATIENT
Start: 2025-01-16 | End: 2025-01-19 | Stop reason: HOSPADM

## 2025-01-16 RX ORDER — HYDROMORPHONE HYDROCHLORIDE 1 MG/ML
0.5 INJECTION, SOLUTION INTRAMUSCULAR; INTRAVENOUS; SUBCUTANEOUS
Status: COMPLETED | OUTPATIENT
Start: 2025-01-16 | End: 2025-01-17

## 2025-01-16 RX ORDER — CLONIDINE HYDROCHLORIDE 0.1 MG/1
0.1 TABLET ORAL EVERY 8 HOURS
Status: DISCONTINUED | OUTPATIENT
Start: 2025-01-16 | End: 2025-01-17

## 2025-01-16 RX ORDER — SODIUM CHLORIDE, SODIUM LACTATE, POTASSIUM CHLORIDE, CALCIUM CHLORIDE 600; 310; 30; 20 MG/100ML; MG/100ML; MG/100ML; MG/100ML
INJECTION, SOLUTION INTRAVENOUS CONTINUOUS
Status: DISCONTINUED | OUTPATIENT
Start: 2025-01-16 | End: 2025-01-18

## 2025-01-16 RX ORDER — ESCITALOPRAM OXALATE 10 MG/1
20 TABLET ORAL DAILY
Status: DISCONTINUED | OUTPATIENT
Start: 2025-01-16 | End: 2025-01-19 | Stop reason: HOSPADM

## 2025-01-16 RX ORDER — LIDOCAINE 40 MG/G
CREAM TOPICAL
Status: DISCONTINUED | OUTPATIENT
Start: 2025-01-16 | End: 2025-01-19 | Stop reason: HOSPADM

## 2025-01-16 RX ORDER — MAGNESIUM HYDROXIDE/ALUMINUM HYDROXICE/SIMETHICONE 120; 1200; 1200 MG/30ML; MG/30ML; MG/30ML
30 SUSPENSION ORAL EVERY 4 HOURS PRN
Status: DISCONTINUED | OUTPATIENT
Start: 2025-01-16 | End: 2025-01-19 | Stop reason: HOSPADM

## 2025-01-16 RX ORDER — PANTOPRAZOLE SODIUM 40 MG/1
40 TABLET, DELAYED RELEASE ORAL DAILY
Status: DISCONTINUED | OUTPATIENT
Start: 2025-01-16 | End: 2025-01-19 | Stop reason: HOSPADM

## 2025-01-16 RX ORDER — DIAZEPAM 10 MG/2ML
5-10 INJECTION, SOLUTION INTRAMUSCULAR; INTRAVENOUS EVERY 30 MIN PRN
Status: DISCONTINUED | OUTPATIENT
Start: 2025-01-16 | End: 2025-01-19 | Stop reason: HOSPADM

## 2025-01-16 RX ORDER — FOLIC ACID 5 MG/ML
1 INJECTION, SOLUTION INTRAMUSCULAR; INTRAVENOUS; SUBCUTANEOUS DAILY
Status: DISCONTINUED | OUTPATIENT
Start: 2025-01-16 | End: 2025-01-18

## 2025-01-16 RX ORDER — AMOXICILLIN 250 MG
2 CAPSULE ORAL 2 TIMES DAILY PRN
Status: DISCONTINUED | OUTPATIENT
Start: 2025-01-16 | End: 2025-01-19 | Stop reason: HOSPADM

## 2025-01-16 RX ORDER — SODIUM CHLORIDE 9 MG/ML
INJECTION, SOLUTION INTRAVENOUS ONCE
Status: COMPLETED | OUTPATIENT
Start: 2025-01-16 | End: 2025-01-16

## 2025-01-16 RX ORDER — AMLODIPINE BESYLATE 5 MG/1
5 TABLET ORAL DAILY
Status: DISCONTINUED | OUTPATIENT
Start: 2025-01-16 | End: 2025-01-17

## 2025-01-16 RX ORDER — ESCITALOPRAM OXALATE 20 MG/1
20 TABLET ORAL DAILY
COMMUNITY

## 2025-01-16 RX ORDER — OXYCODONE HYDROCHLORIDE 5 MG/1
5 TABLET ORAL EVERY 4 HOURS PRN
Status: DISCONTINUED | OUTPATIENT
Start: 2025-01-16 | End: 2025-01-19 | Stop reason: HOSPADM

## 2025-01-16 RX ORDER — THIAMINE HYDROCHLORIDE 100 MG/ML
100 INJECTION, SOLUTION INTRAMUSCULAR; INTRAVENOUS DAILY
Status: DISCONTINUED | OUTPATIENT
Start: 2025-01-16 | End: 2025-01-18

## 2025-01-16 RX ORDER — ACETAMINOPHEN 650 MG/1
650 SUPPOSITORY RECTAL EVERY 8 HOURS PRN
Status: DISCONTINUED | OUTPATIENT
Start: 2025-01-16 | End: 2025-01-19 | Stop reason: HOSPADM

## 2025-01-16 RX ORDER — MULTIPLE VITAMINS W/ MINERALS TAB 9MG-400MCG
1 TAB ORAL DAILY
Status: DISCONTINUED | OUTPATIENT
Start: 2025-01-16 | End: 2025-01-19 | Stop reason: HOSPADM

## 2025-01-16 RX ORDER — ONDANSETRON 2 MG/ML
4 INJECTION INTRAMUSCULAR; INTRAVENOUS EVERY 6 HOURS PRN
Status: DISCONTINUED | OUTPATIENT
Start: 2025-01-16 | End: 2025-01-19 | Stop reason: HOSPADM

## 2025-01-16 RX ORDER — ACETAMINOPHEN 325 MG/1
650 TABLET ORAL EVERY 8 HOURS PRN
Status: DISCONTINUED | OUTPATIENT
Start: 2025-01-16 | End: 2025-01-19 | Stop reason: HOSPADM

## 2025-01-16 RX ADMIN — HYDROMORPHONE HYDROCHLORIDE 0.5 MG: 1 INJECTION, SOLUTION INTRAMUSCULAR; INTRAVENOUS; SUBCUTANEOUS at 01:40

## 2025-01-16 RX ADMIN — HYDROMORPHONE HYDROCHLORIDE 0.5 MG: 1 INJECTION, SOLUTION INTRAMUSCULAR; INTRAVENOUS; SUBCUTANEOUS at 21:03

## 2025-01-16 RX ADMIN — DIAZEPAM 10 MG: 5 TABLET ORAL at 20:23

## 2025-01-16 RX ADMIN — CLONIDINE HYDROCHLORIDE 0.1 MG: 0.1 TABLET ORAL at 01:30

## 2025-01-16 RX ADMIN — CLONIDINE HYDROCHLORIDE 0.1 MG: 0.1 TABLET ORAL at 08:30

## 2025-01-16 RX ADMIN — FAMOTIDINE 20 MG: 20 TABLET, FILM COATED ORAL at 08:24

## 2025-01-16 RX ADMIN — DIAZEPAM 10 MG: 5 TABLET ORAL at 01:30

## 2025-01-16 RX ADMIN — FENTANYL CITRATE 25 MCG: 50 INJECTION INTRAMUSCULAR; INTRAVENOUS at 16:29

## 2025-01-16 RX ADMIN — FOLIC ACID 1 MG: 5 INJECTION, SOLUTION INTRAMUSCULAR; INTRAVENOUS; SUBCUTANEOUS at 08:31

## 2025-01-16 RX ADMIN — SODIUM CHLORIDE, POTASSIUM CHLORIDE, SODIUM LACTATE AND CALCIUM CHLORIDE: 600; 310; 30; 20 INJECTION, SOLUTION INTRAVENOUS at 12:16

## 2025-01-16 RX ADMIN — DIAZEPAM 10 MG: 5 TABLET ORAL at 16:29

## 2025-01-16 RX ADMIN — FENTANYL CITRATE 25 MCG: 50 INJECTION INTRAMUSCULAR; INTRAVENOUS at 08:31

## 2025-01-16 RX ADMIN — PANTOPRAZOLE SODIUM 40 MG: 40 TABLET, DELAYED RELEASE ORAL at 10:58

## 2025-01-16 RX ADMIN — ESCITALOPRAM OXALATE 20 MG: 20 TABLET ORAL at 10:58

## 2025-01-16 RX ADMIN — ACETAMINOPHEN 650 MG: 325 TABLET ORAL at 08:30

## 2025-01-16 RX ADMIN — CLONIDINE HYDROCHLORIDE 0.1 MG: 0.1 TABLET ORAL at 16:30

## 2025-01-16 RX ADMIN — SODIUM CHLORIDE: 9 INJECTION, SOLUTION INTRAVENOUS at 01:44

## 2025-01-16 RX ADMIN — FENTANYL CITRATE 25 MCG: 50 INJECTION INTRAMUSCULAR; INTRAVENOUS at 12:10

## 2025-01-16 RX ADMIN — AMLODIPINE BESYLATE 5 MG: 5 TABLET ORAL at 06:56

## 2025-01-16 RX ADMIN — FOLIC ACID: 5 INJECTION, SOLUTION INTRAMUSCULAR; INTRAVENOUS; SUBCUTANEOUS at 04:02

## 2025-01-16 RX ADMIN — Medication 1 TABLET: at 08:24

## 2025-01-16 RX ADMIN — THIAMINE HYDROCHLORIDE 100 MG: 100 INJECTION, SOLUTION INTRAMUSCULAR; INTRAVENOUS at 08:24

## 2025-01-16 ASSESSMENT — LIFESTYLE VARIABLES
AUDITORY DISTURBANCES: NOT PRESENT
ANXIETY: 5
NAUSEA AND VOMITING: NO NAUSEA AND NO VOMITING
TOTAL SCORE: 11
ANXIETY: EQUIVALENT TO ACUTE PANIC STATES AS SEEN IN SEVERE DELIRIUM OR ACUTE SCHIZOPHRENIC REACTIONS
NAUSEA AND VOMITING: NO NAUSEA AND NO VOMITING
ANXIETY: MILDLY ANXIOUS
VISUAL DISTURBANCES: NOT PRESENT
TOTAL SCORE: 7
AGITATION: NORMAL ACTIVITY
VISUAL DISTURBANCES: NOT PRESENT
AUDITORY DISTURBANCES: NOT PRESENT
PAROXYSMAL SWEATS: 2
ORIENTATION AND CLOUDING OF SENSORIUM: ORIENTED AND CAN DO SERIAL ADDITIONS
TREMOR: MODERATE, WITH PATIENT'S ARMS EXTENDED
TREMOR: 2
ORIENTATION AND CLOUDING OF SENSORIUM: ORIENTED AND CAN DO SERIAL ADDITIONS
TREMOR: 2
HEADACHE, FULLNESS IN HEAD: NOT PRESENT
PAROXYSMAL SWEATS: NO SWEAT VISIBLE
PAROXYSMAL SWEATS: NO SWEAT VISIBLE
HEADACHE, FULLNESS IN HEAD: NOT PRESENT
NAUSEA AND VOMITING: NO NAUSEA AND NO VOMITING
AGITATION: NORMAL ACTIVITY
AGITATION: NORMAL ACTIVITY
VISUAL DISTURBANCES: NOT PRESENT
ORIENTATION AND CLOUDING OF SENSORIUM: ORIENTED AND CAN DO SERIAL ADDITIONS
TOTAL SCORE: 5
AUDITORY DISTURBANCES: NOT PRESENT
HEADACHE, FULLNESS IN HEAD: NOT PRESENT

## 2025-01-16 ASSESSMENT — ACTIVITIES OF DAILY LIVING (ADL)
ADLS_ACUITY_SCORE: 41
ADLS_ACUITY_SCORE: 41
ADLS_ACUITY_SCORE: 46
ADLS_ACUITY_SCORE: 41
ADLS_ACUITY_SCORE: 41
ADLS_ACUITY_SCORE: 46
ADLS_ACUITY_SCORE: 41
ADLS_ACUITY_SCORE: 46
ADLS_ACUITY_SCORE: 45
ADLS_ACUITY_SCORE: 46
ADLS_ACUITY_SCORE: 41
ADLS_ACUITY_SCORE: 46
ADLS_ACUITY_SCORE: 41
ADLS_ACUITY_SCORE: 46
ADLS_ACUITY_SCORE: 45
ADLS_ACUITY_SCORE: 46
ADLS_ACUITY_SCORE: 41

## 2025-01-16 NOTE — H&P
[facility]    History and Physical - Hospitalist Service       Date of Admission:  1/15/2025    Assessment & Plan    Active Problems:    Alcohol use disorder    Hypertension    Metabolic acidosis    Acute kidney injury    Alcohol withdrawal seizure with complication (H)    Alcohol-induced acute pancreatitis, unspecified complication status      Luiz Herbert is a 49 year old male admitted on 1/15/2025. He has medical history significant for hypertension, osteoarthritis, anxiety and depression, anemia, and familial hypercholesterolemia who presented to the ED with complaints of abdominal pain, shortness of breath, and nausea and vomiting.     # Alcohol dependence with severe withdrawal  # Alcohol withdrawal seizures with complication  Reports that he has been drinking about 750 mL of vodka daily.  Reports that he drinks a lot of beer when he is not drinking vodka.  Has been drinking heavily for at least 4 years.  [facility] protocol  Banana bag  Thiamine, folic acid, and multivitamins.  Seizure precautions    # Alcoholic pancreatitis: Patient with abdominal pain and lipase of 2078.  Patient with severe alcohol dependence.  Has been drinking about 750 mL of vodka daily for over 4 years.  CT abdomen pelvis showing acute pancreatitis with moderate extensive amount of very pancreatic stranding/edema.  IV fluids  N.p.o.  Monitor CBC and renal function  As needed pain regimen    # Hyponatremia: Likely due to beer potomania  Sodium of 127.  Improved to 129.  Receiving IV fluids  Monitor sodium levels    # Anion gap metabolic acidosis  # Hyperchloremic acidosis: Likely due to starvation ketoacidosis with alcoholic ketoacidosis and dehydration from vomiting also contributing.  IV fluids  Monitor renal function    # Acute renal failure: Creatinine of 2.5 with anion gap metabolic acidosis and hyponatremia.  Also with hypophosphatemia and hypomagnesemia  Baseline creatinine is 0.8-1.0  IV fluids  Monitor  renal function  Avoid nephrotoxins  Renal dosing of medications    # Right retroperitoneal nodule: Patient with 3 cm soft tissue nodule in the right side of the retroperitoneum at the level of the kidneys, significantly increased in size since studies dating back to 10/24/2021.  Also has a few additional borderline and mildly enlarged lymph nodes in the region that are also prominent.  Will likely need percutaneous imaging guided biopsy      #Anxiety and depression:   Continue lexapro.     # Unintended weight loss: Patient reports that he has been losing significant amount of weight.  He reports that he has not been eating well.  Chart review shows that his weight was 185#March 2024.  Today, patient weighs 165 pounds.  Dietitian consult          Diet: NPO for Medical/Clinical Reasons Except for: Meds, Ice Chips    DVT Prophylaxis: Low Risk/Ambulatory with no VTE prophylaxis indicated  Walker Catheter: Not present  Lines: None     Cardiac Monitoring: ACTIVE order. Indication: Tachyarrhythmias, acute (48 hours)  Code Status: Full Code      Clinically Significant Risk Factors Present on Admission         # Hyponatremia: Lowest Na = 127 mmol/L in last 2 days, will monitor as appropriate  # Hypochloremia: Lowest Cl = 82 mmol/L in last 2 days, will monitor as appropriate  # Hypocalcemia: Lowest Ca = 8.6 mg/dL in last 2 days, will monitor and replace as appropriate    # Anion Gap Metabolic Acidosis: Highest Anion Gap = 29 mmol/L in last 2 days, will monitor and treat as appropriate      # Hypertension: Noted on problem list                      Disposition Plan     Medically Ready for Discharge: Anticipated in 2-4 Days           Ulysses Bell MD  Hospitalist Service  [facility]  Securely message with [employer] (more info)  Text page via [employer] Paging/Directory     ______________________________________________________________________    Chief Complaint   Alcohol withdrawal    History is obtained  from the patient    History of Present Illness   Luiz Herbert is a 49 year old male with medical history significant for hypertension, osteoarthritis, anxiety and depression, anemia, and familial hypercholesterolemia who presented to the ED with complaints of abdominal pain, shortness of breath, and nausea and vomiting.  Patient reports that he has been drinking about 750 mL of vodka daily for about 4 years.  States that he occasionally drinks beer instead of vodka.  However, states that he has been drinking too much for a long time.  Reports that he has been wanting to quit drinking but has been unable to stop for a while.  Decided to stop drinking on Tuesday night because he had abdominal pain.  Reports he has had multiple episodes of nonbloody and nonbilious emesis.  Denies any melena or hematochezia.  Reports 10/10, constant, sharp, left-sided abdominal pain that is nonradiating.  Denies any abdominal wall bruising.  He reports diaphoresis and chills.  He denies any cough, fevers, chest pain, dysuria, hematuria, focal weakness or loss of sensation, or any other new symptoms.    He reports that he smokes a lot of marijuana.  Denies any tobacco or illicit drug use.      Past Medical History    Past Medical History:   Diagnosis Date    Anemia     Anxiety     Depressive disorder     Essential familial hypercholesterolemia        Past Surgical History   History reviewed. No pertinent surgical history.    Prior to Admission Medications   Prior to Admission Medications   Prescriptions Last Dose Informant Patient Reported? Taking?   alum & mag hydroxide-simethicone (MAALOX MAX) 400-400-40 MG/5ML SUSP suspension   No No   Sig: Take 30 mLs by mouth every 4 hours as needed for indigestion.   cyclobenzaprine (FLEXERIL) 10 MG tablet   No No   Sig: Take 1 tablet (10 mg) by mouth 2 times daily as needed for muscle spasms   escitalopram oxalate (LEXAPRO) 10 MG tablet   No No   Sig: [ESCITALOPRAM OXALATE (LEXAPRO) 10 MG  TABLET] TAKE 1 TABLET BY MOUTH DAILY   ondansetron (ZOFRAN ODT) 4 MG ODT tab   No No   Sig: Take 1 tablet (4 mg) by mouth every 8 hours as needed for nausea   sucralfate (CARAFATE) 1 GM/10ML suspension   No No   Sig: Take 10 mLs (1 g) by mouth 4 times daily      Facility-Administered Medications: None        Review of Systems    As per HPI otherwise 14 point review of systems negative.     Physical Exam   Vital Signs: Temp: 97.4  F (36.3  C) Temp src: Oral BP: (!) 168/121 Pulse: 108   Resp: 22 SpO2: 100 % O2 Device: None (Room air)    Weight: 165 lbs 0 oz    General: AAOx3.  Mild to moderate distress.  Diaphoretic.  Appears anxious  HEENT: NCAT, pupils are equal and round, anicteric, oral mucosa is moist.  Neck: Supple,   Cardiac: Tachycardic.  RR.  No murmur. No rub or gallop.  Respiratory: CTAB, no crackles, wheezes, rales, or rhonchi  Abdomen: Diffuse abdominal tenderness palpation without guarding.  Tenderness is worse in the left upper to mid abdomen.  Soft, ND, + bowel sounds  Extremity: No LE edema, DP pulses palpable.   Neuro: AAO x3,   Psych: Calm and cooperative.       Medical Decision Making       >60 MINUTES SPENT BY ME on the date of service doing chart review, history, exam, documentation & further activities per the note.      Data     I have personally reviewed the following data over the past 24 hrs:    15.4 (H)  \   19.4 (H)   / 229     129 (L) 87 (L) 29.5 (H) /  185 (H)   4.1 18 (L) 2.02 (H) \     ALT: 206 (H) AST: N/A AP: 155 (H) TBILI: 2.5 (H)   ALB: 3.9 TOT PROTEIN: 7.9 LIPASE: 2,078 (H)       Imaging results reviewed over the past 24 hrs:   Recent Results (from the past 24 hours)   CT Abdomen Pelvis w/o Contrast    Narrative    EXAM: CT ABDOMEN AND PELVIS WITHOUT CONTRAST  LOCATION: [facility]  DATE/TIME: 1/16/2025 12:56 AM CST    INDICATION: Shortness of breath and abdominal pain. Concern for alcoholic pancreatitis.  COMPARISON: 3/21/2024 and  10/24/2021.    TECHNIQUE: CT scan of the abdomen and pelvis was performed without IV contrast. Multiplanar reformats were obtained. Dose reduction techniques were used.  CONTRAST: None.    FINDINGS:    LOWER CHEST: Unremarkable.    HEPATOBILIARY: Moderate to marked diffuse fatty infiltration of the liver.    SPLEEN: Unremarkable.    PANCREAS: Moderate to extensive peripancreatic stranding/edema. No circumscribed peripancreatic fluid collection.    ADRENAL GLANDS: Unremarkable.    KIDNEYS/BLADDER: Unremarkable.    BOWEL: Small hiatal hernia. The appendix is likely visualized and unremarkable.    LYMPH NODES: Unremarkable.    PELVIC ORGANS: No acute findings.    MUSCULOSKELETAL: No acute findings.    OTHER: 3.0 x 2.5 cm soft tissue attenuation structure in the right side of the retroperitoneum, posterior to the inferior vena cava at the insertion site of the right renal vein (series 3 image 73). This may have mildly increased in size since 3/21/2024   in which it measured 2.7 x 2.5 cm. It has definitely increased in size since 10/24/2021 when it measured 1.4 x 0.9 cm. This most likely represents an enlarged lymph node. There are a few additional borderline and mildly enlarged retroperitoneal lymph   nodes in this region that are also more prominent than prior studies. Small paraumbilical hernia containing fat.      Impression    IMPRESSION:   1. Acute pancreatitis: There is a moderate to extensive amount of peripancreatic stranding/edema.  2. No other cause of acute pain identified in the abdomen or pelvis.   3. 3 cm soft tissue nodule in the right side of the retroperitoneum at the level of the kidneys, significantly increased in size since studies dating back to 10/24/2021. This most likely represents an enlarged lymph node. There are a few additional   borderline and mildly enlarged lymph nodes in this region that are also more prominent. These are nonspecific, but suspicious for neoplasm. The larger nodule is  likely amenable to percutaneous imaging-guided biopsy.

## 2025-01-16 NOTE — PLAN OF CARE
Goal Outcome Evaluation:    Patient is alert and oriented and able to communicate his needs to staff. He received 25 mc Fentnyl for extreme stomach pain r/t pancreatitis. Patient immediately full asleep and has been sleeping since then. Patient did drink a few sips of juice and had 2 tsps of jello,before he fell asleep. Telemetry shows Sinus tach. Will continue to monitor.

## 2025-01-16 NOTE — ED TRIAGE NOTES
"To ED per EMS    Activated EMS dt SOB/abd pain, and when EMS arrived pt had a 1 min \"tonic clonic seizure\"    States was \"self detoxing\" from a 7 year hx of alcoholism.     Denies PMH of seizures    States his abd pain is constant and worsened with burping and movement.      Triage Assessment (Adult)       Row Name 01/15/25 7941          Triage Assessment    Airway WDL WDL        Respiratory WDL    Respiratory WDL WDL        Skin Circulation/Temperature WDL    Skin Circulation/Temperature WDL WDL        Cardiac WDL    Cardiac WDL rhythm     Pulse Rate & Regularity tachycardic        Peripheral/Neurovascular WDL    Peripheral Neurovascular WDL WDL        Cognitive/Neuro/Behavioral WDL    Cognitive/Neuro/Behavioral WDL WDL                     "

## 2025-01-16 NOTE — PROGRESS NOTES
Brief progress note:    Patient is a 49-year-old male who was admitted for alcohol withdrawal and pancreatitis.  Also found to have acute kidney injury, metabolic acidosis and hyponatremia.  He has been initiated on IV fluids with minimal improvement in his renal function and electrolytes.  His abdominal pain is improving.  Full liquid diet was initiated.  Continue pain management for pancreatitis.  Continue CIWA protocol for alcohol withdrawal.  Discussed with patient's finding of right retroperitoneal nodule need to follow-up with primary care physician for possible biopsy as an outpatient.    Elevated LFTs and bilirubin-check abdominal ultrasound  Hyperglycemia.  Patient with A1c of 6.0.  In prediabetic range  Acute kidney injury-continue IV fluids  Hyponatremia-continue IV fluids and encourage oral intake  Alcohol withdrawal-continue CIWA protocol  Starvation ketosis-encourage oral intake    Discussed with patient possibility of transfer to a different hospital.  Patient declined and wished to stay at [facility].     Felisa Lyon,

## 2025-01-16 NOTE — CONSULTS
"CLINICAL NUTRITION SERVICES - ASSESSMENT NOTE     Nutrition Prescription    RECOMMENDATIONS FOR MDs/PROVIDERS TO ORDER:  None    Malnutrition Status:    Moderate malnutrition  In Context of:  Acute illness or injury    Recommendations already ordered by Registered Dietitian (RD):  Encouraged pt to have electrolyte supplement, sources of real nutrition at home    Future/Additional Recommendations:  Monitor po intake     REASON FOR ASSESSMENT  Luiz Herbert is a/an 49 year old male assessed by the dietitian for Provider Order - Unintended weight loss     NUTRITION HISTORY  Dx: AUD w/ severe withdrawal, alcoholic pancreatitis, TYSHAWN    Met with pt in ED room this morning. Pt not hungry but very thirsty. Pt states the last meal he ate was on Monday, having progressive GI pain and nausea leading to current admission. 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     We discussed having electrolyte beverages or supplements available at home to prevent hypernatremia. Encouraged drinking cessation. Encouraged pt to have some form of real nutrition at home, especially during bouts of drinking.     CURRENT NUTRITION ORDERS  Diet: Full Liquid and Low Saturated Fat/2400 mg Sodium  Intake/Tolerance: None    LABS  Labs reviewed  Na: 128 (L)  BUN: 30.6 (H)  Cr: 1.91 (H)  Ma.4 (H)  Phos: 6.0 (H)  Tbili: 2.2 (H)  Lipase: 1007 (H)    MEDICATIONS  Medications reviewed  Pepcid  Thera-Vit-M  Protonix  Thiamine 100 mg  LR infusion @150 mL/hr    ANTHROPOMETRICS  Height: 177.8 cm (5' 10\")  Most Recent Weight: 74.8 kg (165 lb)    IBW: 75.4 kg  BMI: Normal BMI  Weight History:   01/15/25 : 74.8 kg (165 lb)  24 : 80.6 kg (177 lb 9.6 oz)  24 : 83.9 kg (185 lb)  7.7% wt loss in 4 months    Dosing Weight: 75 kg actual body wt    ASSESSED NUTRITION NEEDS  Estimated Energy Needs: 5194-0411 kcals/day (25 - 30 kcals/kg)  Justification: Maintenance  Estimated Protein Needs: 75-90 grams protein/day (1 - 1.2 " grams of pro/kg)  Justification: Increased needs with pancreatitis  Estimated Fluid Needs: 3414-8264 mL/day (1 mL/kcal)   Justification: Maintenance    PHYSICAL FINDINGS  See malnutrition section below.    MALNUTRITION:  % Weight Loss:  7.5% in 3 months (moderate malnutrition)  % Intake:  </= 50% for >/= 5 days (severe malnutrition)  Subcutaneous Fat Loss:  Orbital region mild depletion and Thoracic region mild depletion  Muscle Loss:  None observed  Fluid Retention:  None noted    Malnutrition Diagnosis: Moderate malnutrition  In Context of:  Acute illness or injury    NUTRITION DIAGNOSIS  Malnutrition related to poor appetite secondary to etoh abuse as evidenced by 7.5%       INTERVENTIONS  Implementation  Encouraged pt to have electrolyte supplement, sources of real nutrition at home    Goals  Patient to consume % of nutritionally adequate meals three times per day, or the equivalent with supplements/snacks.     Monitoring/Evaluation  Progress toward goals will be monitored and evaluated per protocol.

## 2025-01-16 NOTE — ED PROVIDER NOTES
EMERGENCY DEPARTMENT ENCOUNTER      NAME: Luiz Herbert  AGE: 49 year old male  YOB: 1975  MRN: [id number]  EVALUATION DATE & TIME: 1/15/2025 10:37 PM    PCP: Taz Lewis    ED PROVIDER: Justin Tang M.D.      Chief Complaint   Patient presents with    Abdominal Pain    Shortness of Breath    Alcohol Intoxication    Seizures         FINAL IMPRESSION:  1. Alcohol withdrawal seizure with complication (H)    2. Alcohol-induced acute pancreatitis, unspecified complication status    3. Acute kidney injury    4. Metabolic acidosis          ED COURSE & MEDICAL DECISION MAKING:    Pertinent Labs & Imaging studies reviewed below.  All EKGs below represent my independent interpretation.   ED Course as of 01/16/25 0353   Wed Nacho 15, 2025   1059 Patient is a 49-year-old gent with history of alcohol dependence who presents with nausea, vomiting, abdominal pain over the last 24 hours and then had a seizure witnessed by medics.  No history of seizure disorder.  On arrival he is hypertensive at 165/102 with normal temperature, heart rate mildly elevated at 114.  No evidence of trauma from the seizure activity.  He is awake and alert, clear sensorium care and showing signs of withdrawal, dehydration.  Plan to give IV fluids, Valium, Protonix and Zofran.  He will likely need to remain here in the hospital for alcohol withdrawal management, seizure precaution   2331 EKG shows sinus tachycardia stated 113, isolated PVC.  Incomplete right bundle branch block.  Left anterior fascicular block.  Normal intervals.  When compared to prior EKG on September 1, 2024, heart rate is increased, but otherwise no significant change.  impression: Sinus tachycardia with incomplete right bundle branch block and left anterior fascicular   Thu Jan 16, 2025   0105 Based on TYSHAWN I ordered maintenance IV fluids, he has already received a liter.   0105 He was placed on CIWA alcohol withdrawal protocol   0136 CT Abdomen Pelvis w/o  Contrast  1. Acute pancreatitis: There is a moderate to extensive amount of peripancreatic stranding/edema.  2. No other cause of acute pain identified in the abdomen or pelvis.   3. 3 cm soft tissue nodule in the right side of the retroperitoneum at the level of the kidneys, significantly increased in size since studies dating back to 10/24/2021. This most likely represents an enlarged lymph node. There are a few additional   borderline and mildly enlarged lymph nodes in this region that are also more prominent. These are nonspecific, but suspicious for neoplasm. The larger nodule is likely amenable to percutaneous imaging guided biopsy.     Patient has been here for almost 4 hours, no repeat seizure activity, although he continues to show signs of withdrawal, it is not getting markedly worse and he has not needing high doses of benzodiazepines currently.  However he has CT scan and lipase consistent with acute pancreatitis, most certainly alcohol-induced and he has a significant TYSHAWN that he has already received a liter of fluids and has been started on maintenance IV fluids 4.  His metabolic acidosis is probably due to starvation ketosis, although glucose is mildly elevated.  DKA is less likely given the clinical information, so no insulin has been given at this time.      Additional ED Course timestamps entered by medical scribe:  11:05 PM I met with the patient, obtained history, performed an initial exam, and discussed options and plan for diagnostics and treatment here in the ED.     Medical Decision Making  Obtained supplemental history:Supplemental history obtained?: Friend  Reviewed external records: External records reviewed?: No  Care impacted by chronic illness:Alcohol and/or Drug Abuse or Dependence  Care significantly affected by social determinants of health:N/A  Did you consider but not order tests?: Work up considered but not performed and documented in chart, if applicable  Did you interpret images  independently?: Independent interpretation of ECG and images noted in documentation, when applicable.  Consultation discussion with other provider: Phone conversation with consultants will be documented in the ED Course  Admit.     MIPS Criteria Documentation: Not Applicable      MEDICATIONS GIVEN IN THE EMERGENCY:  Medications   cloNIDine (CATAPRES) tablet 0.1 mg (0.1 mg Oral $Given 1/16/25 0130)   diazepam (VALIUM) tablet 10 mg (10 mg Oral $Given 1/16/25 0130)     Or   diazepam (VALIUM) injection 5-10 mg ( Intravenous See Alternative 1/16/25 0130)   multivitamin w/minerals (THERA-VIT-M) tablet 1 tablet (has no administration in time range)   thiamine (B-1) injection 100 mg (has no administration in time range)   folic acid injection 1 mg (has no administration in time range)   lidocaine 1 % 0.1-1 mL (has no administration in time range)   lidocaine (LMX4) cream (has no administration in time range)   sodium chloride (PF) 0.9% PF flush 3 mL (3 mLs Intracatheter Not Given 1/16/25 0258)   sodium chloride (PF) 0.9% PF flush 3 mL (has no administration in time range)   senna-docusate (SENOKOT-S/PERICOLACE) 8.6-50 MG per tablet 1 tablet (has no administration in time range)     Or   senna-docusate (SENOKOT-S/PERICOLACE) 8.6-50 MG per tablet 2 tablet (has no administration in time range)   sodium chloride 0.9 % 1,000 mL with Infuvite Adult 10 mL, thiamine 100 mg, folic acid 1 mg infusion (has no administration in time range)   lactated ringers infusion (has no administration in time range)   hydrALAZINE (APRESOLINE) tablet 10 mg (has no administration in time range)     Or   hydrALAZINE (APRESOLINE) injection 10 mg (has no administration in time range)   acetaminophen (TYLENOL) tablet 650 mg (has no administration in time range)     Or   acetaminophen (TYLENOL) Suppository 650 mg (has no administration in time range)   oxyCODONE IR (ROXICODONE) half-tab 2.5 mg (has no administration in time range)   oxyCODONE  "(ROXICODONE) tablet 5 mg (has no administration in time range)   fentaNYL (PF) (SUBLIMAZE) injection 25 mcg (has no administration in time range)   melatonin tablet 5 mg (has no administration in time range)   ondansetron (ZOFRAN ODT) ODT tab 4 mg (has no administration in time range)     Or   ondansetron (ZOFRAN) injection 4 mg (has no administration in time range)   prochlorperazine (COMPAZINE) injection 10 mg (has no administration in time range)     Or   prochlorperazine (COMPAZINE) tablet 10 mg (has no administration in time range)   famotidine (PEPCID) tablet 20 mg (has no administration in time range)   benzocaine-menthol (CEPACOL) 15-3.6 MG lozenge 1 lozenge (has no administration in time range)   naloxone (NARCAN) nasal spray 4 mg (has no administration in time range)   amLODIPine (NORVASC) tablet 5 mg (has no administration in time range)   lactated ringers BOLUS 1,000 mL (0 mLs Intravenous Stopped 1/16/25 0144)   diazepam (VALIUM) injection 5 mg (5 mg Intravenous $Given 1/15/25 2336)   ondansetron (ZOFRAN) injection 4 mg (4 mg Intravenous $Given 1/15/25 2339)   pantoprazole (PROTONIX) IV push injection 40 mg (40 mg Intravenous $Given 1/15/25 2341)   sodium chloride 0.9 % infusion ( Intravenous $New Bag 1/16/25 0144)   HYDROmorphone (PF) (DILAUDID) injection 0.5 mg (0.5 mg Intravenous $Given 1/16/25 0140)         NEW PRESCRIPTIONS STARTED AT TODAY'S ER VISIT  New Prescriptions    No medications on file          =================================================================    HPI    Luiz Herbert is a 49 year old male who presents to this ED for evaluation of seizure.    Recently, the patient has been drinking ~1L vodka daily but has started to \"self detox\". His last drink was yesterday. For the last few days he has had nausea, vomiting, and abdominal pain. This evening, he called EMS and when they arrived he had 1 minute \"tonic clonic seizure\" per EMS. His friend states he was unresponsive for 3-4 " "minutes. The patient was sitting on the stairs when this happened but \"went straight\". No previous history of seizures. His primary complaint now is his epigastric abdominal pain. He also feels tired and has occasional mild back pain. The patient does have a history of GERD and takes omeprazole and another medication for this. No history of pancreatitis.     He denies hematemesis, neck pain, shoulder pain, or any other complaints at this time.       VITALS:  BP (!) 147/112   Pulse 117   Temp 97.4  F (36.3  C) (Oral)   Resp 18   Ht 1.778 m (5' 10\")   Wt 74.8 kg (165 lb)   SpO2 97%   BMI 23.68 kg/m      PHYSICAL EXAM    Constitutional: Well developed, well nourished. Comfortable appearing.   HENT: Atraumatic, no painful lumps, bumps to scalp. Nose midline, bilateral nares patent. No pain along mandible, no trismus. Dry mucous membranes.  Eyes: PERRL, mid-range pupils, conjunctiva normal.  Neck: No midline cervical spine tenderness. No midline pain with active flexion, extension, or bilateral rotation.  Respiratory: CTAB. Normal work of breathing. No abrasions, contusion or tenderness to chest wall.  Cardiovascular: Regular rhythm. 2+ radial pulses bilaterally. 2+ DP pulses bilaterally. Tachycardic.  Musculoskeletal: Full active pain-free ROM to bilateral shoulders, elbows, wrists, hands, hips, knees, and ankles. No pain with palpation along joints and long bones. No abrasions. No pain or laxity with A/P and lateral compression of the pelvis.  Abdomen: No guarding. No seatbelt sign. Epigastric tenderness.  Back: No midline C,T,L tenderness or deformity. No abrasions or bony stepoffs.  Neurologic: Alert & oriented x 3, cranial nerves grossly intact. Normal gross coordination. No tremor.  Psychiatric: Affect normal, cooperative.      PROCEDURES:   None      Jignesh YANEZ am serving as a scribe to document services personally performed by Dr. Justin Tang based on my observation and the provider's statements " to me. I, Justin Tang MD attest that Jignesh Mari is acting in a scribe capacity, has observed my performance of the services and has documented them in accordance with my direction.    Justin Tang M.D.  Emergency Medicine  [facility] EMERGENCY ROOM  [address] [facility] [address]  [phone]  Dept: [phone]       Justin Tang MD  01/16/25 0359

## 2025-01-16 NOTE — PHARMACY-ADMISSION MEDICATION HISTORY
Pharmacy Intern Admission Medication History    Admission medication history is complete. The information provided in this note is only as accurate as the sources available at the time of the update.    Information Source(s): Patient and CareEverywhere/SureScripts via in-person    Pertinent Information:    - Patient reports he takes care of his own medication.     Changes made to PTA medication list:  Added:   Omeprazole 20 mg OTC  Deleted:   Cyclobenzaprine 10 mg   Ondansetron 4 mg ODT   Sucralfate 1 gm per 10 mL   Changed:   Escitalopram changed from 10 mg to 20 mg     Allergies reviewed with patient and updates made in EHR: yes    PTA Med List   Medication Sig Last Dose/Taking    alum & mag hydroxide-simethicone (MAALOX MAX) 400-400-40 MG/5ML SUSP suspension Take 30 mLs by mouth every 4 hours as needed for indigestion. 1/15/2025    escitalopram (LEXAPRO) 20 MG tablet Take 20 mg by mouth daily. 1/15/2025    omeprazole (PRILOSEC OTC) 20 MG EC tablet Take 20 mg by mouth daily. 1/15/2025       Medications Available during hospital stay: NONE    Medication History Completed By: Geri Greer  1/16/2025 8:07 AM

## 2025-01-16 NOTE — CONSULTS
1/16/2025  ROVERTO Consult acknowledged.  Due to elevated CIWA scores, I will attempt to see pt for ROVERTO Consult Friday, 1/17/25.  If pt discharges prior to consult, they can call [facility] at [phone] to schedule an OP ROVERTO Assessment.    Domonique Hoover MA [facility]  ROVERTO Evaluation Counselor  [phone]  [email or web address]

## 2025-01-17 LAB
ALBUMIN SERPL BCG-MCNC: 2.8 G/DL (ref 3.5–5.2)
ALP SERPL-CCNC: 96 U/L (ref 40–150)
ALT SERPL W P-5'-P-CCNC: 104 U/L (ref 0–70)
ANION GAP SERPL CALCULATED.3IONS-SCNC: 14 MMOL/L (ref 7–15)
AST SERPL W P-5'-P-CCNC: 134 U/L (ref 0–45)
BILIRUB DIRECT SERPL-MCNC: 1.17 MG/DL (ref 0–0.3)
BILIRUB SERPL-MCNC: 2.3 MG/DL
BUN SERPL-MCNC: 24 MG/DL (ref 6–20)
CALCIUM SERPL-MCNC: 7.9 MG/DL (ref 8.8–10.4)
CHLORIDE SERPL-SCNC: 93 MMOL/L (ref 98–107)
CREAT SERPL-MCNC: 1.2 MG/DL (ref 0.67–1.17)
EGFRCR SERPLBLD CKD-EPI 2021: 74 ML/MIN/1.73M2
ERYTHROCYTE [DISTWIDTH] IN BLOOD BY AUTOMATED COUNT: 14.6 % (ref 10–15)
GLUCOSE SERPL-MCNC: 120 MG/DL (ref 70–99)
HCO3 SERPL-SCNC: 21 MMOL/L (ref 22–29)
HCT VFR BLD AUTO: 36.9 % (ref 40–53)
HGB BLD-MCNC: 13.3 G/DL (ref 13.3–17.7)
MAGNESIUM SERPL-MCNC: 2 MG/DL (ref 1.7–2.3)
MCH RBC QN AUTO: 32.2 PG (ref 26.5–33)
MCHC RBC AUTO-ENTMCNC: 36 G/DL (ref 31.5–36.5)
MCV RBC AUTO: 89 FL (ref 78–100)
PHOSPHATE SERPL-MCNC: 2.5 MG/DL (ref 2.5–4.5)
PLATELET # BLD AUTO: 115 10E3/UL (ref 150–450)
POTASSIUM SERPL-SCNC: 4.2 MMOL/L (ref 3.4–5.3)
PROT SERPL-MCNC: 5.6 G/DL (ref 6.4–8.3)
RBC # BLD AUTO: 4.13 10E6/UL (ref 4.4–5.9)
SODIUM SERPL-SCNC: 128 MMOL/L (ref 135–145)
WBC # BLD AUTO: 8.4 10E3/UL (ref 4–11)

## 2025-01-17 PROCEDURE — 250N000011 HC RX IP 250 OP 636: Performed by: EMERGENCY MEDICINE

## 2025-01-17 PROCEDURE — 82947 ASSAY GLUCOSE BLOOD QUANT: CPT | Performed by: INTERNAL MEDICINE

## 2025-01-17 PROCEDURE — 258N000003 HC RX IP 258 OP 636: Performed by: INTERNAL MEDICINE

## 2025-01-17 PROCEDURE — 120N000004 HC R&B MS OVERFLOW

## 2025-01-17 PROCEDURE — 250N000013 HC RX MED GY IP 250 OP 250 PS 637: Performed by: INTERNAL MEDICINE

## 2025-01-17 PROCEDURE — 80076 HEPATIC FUNCTION PANEL: CPT | Performed by: INTERNAL MEDICINE

## 2025-01-17 PROCEDURE — 250N000013 HC RX MED GY IP 250 OP 250 PS 637: Performed by: EMERGENCY MEDICINE

## 2025-01-17 PROCEDURE — 258N000003 HC RX IP 258 OP 636: Performed by: FAMILY MEDICINE

## 2025-01-17 PROCEDURE — 82565 ASSAY OF CREATININE: CPT | Performed by: INTERNAL MEDICINE

## 2025-01-17 PROCEDURE — 250N000011 HC RX IP 250 OP 636: Performed by: STUDENT IN AN ORGANIZED HEALTH CARE EDUCATION/TRAINING PROGRAM

## 2025-01-17 PROCEDURE — 83735 ASSAY OF MAGNESIUM: CPT | Performed by: INTERNAL MEDICINE

## 2025-01-17 PROCEDURE — 36415 COLL VENOUS BLD VENIPUNCTURE: CPT | Performed by: INTERNAL MEDICINE

## 2025-01-17 PROCEDURE — 82248 BILIRUBIN DIRECT: CPT | Performed by: INTERNAL MEDICINE

## 2025-01-17 PROCEDURE — 84100 ASSAY OF PHOSPHORUS: CPT | Performed by: INTERNAL MEDICINE

## 2025-01-17 PROCEDURE — 85014 HEMATOCRIT: CPT | Performed by: INTERNAL MEDICINE

## 2025-01-17 PROCEDURE — 99233 SBSQ HOSP IP/OBS HIGH 50: CPT | Performed by: FAMILY MEDICINE

## 2025-01-17 RX ADMIN — FAMOTIDINE 20 MG: 20 TABLET, FILM COATED ORAL at 08:22

## 2025-01-17 RX ADMIN — OXYCODONE 5 MG: 5 TABLET ORAL at 08:22

## 2025-01-17 RX ADMIN — FOLIC ACID 1 MG: 5 INJECTION, SOLUTION INTRAMUSCULAR; INTRAVENOUS; SUBCUTANEOUS at 10:50

## 2025-01-17 RX ADMIN — PANTOPRAZOLE SODIUM 40 MG: 40 TABLET, DELAYED RELEASE ORAL at 08:23

## 2025-01-17 RX ADMIN — SODIUM CHLORIDE, POTASSIUM CHLORIDE, SODIUM LACTATE AND CALCIUM CHLORIDE: 600; 310; 30; 20 INJECTION, SOLUTION INTRAVENOUS at 23:42

## 2025-01-17 RX ADMIN — AMLODIPINE BESYLATE 5 MG: 5 TABLET ORAL at 08:23

## 2025-01-17 RX ADMIN — THIAMINE HYDROCHLORIDE 100 MG: 100 INJECTION, SOLUTION INTRAMUSCULAR; INTRAVENOUS at 08:22

## 2025-01-17 RX ADMIN — DIAZEPAM 10 MG: 5 TABLET ORAL at 01:03

## 2025-01-17 RX ADMIN — OXYCODONE 5 MG: 5 TABLET ORAL at 19:39

## 2025-01-17 RX ADMIN — ACETAMINOPHEN 650 MG: 325 TABLET ORAL at 08:22

## 2025-01-17 RX ADMIN — Medication 1 TABLET: at 08:22

## 2025-01-17 RX ADMIN — SODIUM CHLORIDE, POTASSIUM CHLORIDE, SODIUM LACTATE AND CALCIUM CHLORIDE: 600; 310; 30; 20 INJECTION, SOLUTION INTRAVENOUS at 01:04

## 2025-01-17 RX ADMIN — ACETAMINOPHEN 650 MG: 325 TABLET ORAL at 19:39

## 2025-01-17 RX ADMIN — CLONIDINE HYDROCHLORIDE 0.1 MG: 0.1 TABLET ORAL at 00:57

## 2025-01-17 RX ADMIN — SODIUM CHLORIDE, POTASSIUM CHLORIDE, SODIUM LACTATE AND CALCIUM CHLORIDE: 600; 310; 30; 20 INJECTION, SOLUTION INTRAVENOUS at 10:49

## 2025-01-17 RX ADMIN — ESCITALOPRAM OXALATE 20 MG: 20 TABLET ORAL at 08:22

## 2025-01-17 RX ADMIN — HYDROMORPHONE HYDROCHLORIDE 0.5 MG: 1 INJECTION, SOLUTION INTRAMUSCULAR; INTRAVENOUS; SUBCUTANEOUS at 21:13

## 2025-01-17 RX ADMIN — OXYCODONE 5 MG: 5 TABLET ORAL at 23:42

## 2025-01-17 RX ADMIN — CLONIDINE HYDROCHLORIDE 0.1 MG: 0.1 TABLET ORAL at 08:23

## 2025-01-17 RX ADMIN — HYDROMORPHONE HYDROCHLORIDE 0.5 MG: 1 INJECTION, SOLUTION INTRAMUSCULAR; INTRAVENOUS; SUBCUTANEOUS at 04:27

## 2025-01-17 ASSESSMENT — ACTIVITIES OF DAILY LIVING (ADL)
ADLS_ACUITY_SCORE: 52
ADLS_ACUITY_SCORE: 52
ADLS_ACUITY_SCORE: 46
ADLS_ACUITY_SCORE: 46
ADLS_ACUITY_SCORE: 52
DEPENDENT_IADLS:: INDEPENDENT
ADLS_ACUITY_SCORE: 52
ADLS_ACUITY_SCORE: 52
ADLS_ACUITY_SCORE: 46
ADLS_ACUITY_SCORE: 52
ADLS_ACUITY_SCORE: 46
ADLS_ACUITY_SCORE: 52
ADLS_ACUITY_SCORE: 46
ADLS_ACUITY_SCORE: 46
ADLS_ACUITY_SCORE: 52

## 2025-01-17 NOTE — PROGRESS NOTES
[facility] MEDICINE PROGRESS NOTE      Identification/Summary: Luiz Herbert is a 49 year old male with a past medical history of alcohol abuse, dependence, anxiety, depression, GERD, came with abdominal pain with nausea and vomiting.  He admits drinking alcohol heavily for last 4 years.  Found to have alcoholic pancreatitis with lipase 2078.  He responded with bowel rest, IV hydration.  Diet was advanced and tolerated well.  Absolute alcohol cessation is advised.  He had severe alcohol withdrawal symptoms with seizures.  [facility] protocol utilized.  Alcohol withdrawal symptoms are much improved.  Evaluated by chemical dependency.  Complete and intensive outpatient CD treatment program.  Resources are provided.  Electrolytes are replaced.  Continue folic acid and thiamine supplement.    He had an incidental finding of right retroperitoneal lymph node measuring 3 cm on CT scan which is slightly increased in size since 10/2021.  Few additional borderline and mildly enlarged lymph node in that region.  Percutaneous lymph node biopsy ordered.    Assessment and Plan:  Alcohol intoxication  Alcohol abuse and dependence  Severe alcohol withdrawal  Alcohol withdrawal seizure  Absolute alcohol cessation  Patient admits drinking alcohol heavily for last 4 years  CIWA protocol utilized  Alcohol withdrawal symptoms are much improved  Folic acid and thiamine supplement  No further seizure  Evaluated by chemical dependency  Will complete outpatient treatment    Alcohol induced pancreatitis  Improved with bowel rest and IV hydration  Tolerating p.o. well  CT-acute pancreatitis with moderate extensive amount of pancreatic stranding/edema    Anion gap metabolic acidosis secondary to dehydration, corrected    Acute kidney injury.  Creatinine 2.5-->1.2    Hyponatremia, sodium 128  Likely from chronic alcohol use    Alcoholic hepatitis  --> 134,  -->104, direct bilirubin 1.17  CT-moderate  "diffuse fatty infiltration of liver  Ultrasound-normal gallbladder without cholelithiasis    Elevated blood pressure secondary to alcohol withdrawal  Blood pressure is stable now  Norvasc is discontinued    Right retroperitoneal lymphadenopathy  3 cm soft tissue nodule in right side of retroperitoneum  Biopsy ordered.  Patient agreed    Anxiety and depression  Lexapro 20 mg daily    GERD, resume PPI    Code full  DVT prophylaxis  Early ambulation and SCDs    Medically Ready for Discharge: Anticipated Tomorrow        Clinically Significant Risk Factors         # Hyponatremia: Lowest Na = 127 mmol/L in last 2 days, will monitor as appropriate  # Hypochloremia: Lowest Cl = 82 mmol/L in last 2 days, will monitor as appropriate     # Anion Gap Metabolic Acidosis: Highest Anion Gap = 29 mmol/L in last 2 days, will monitor and treat as appropriate  # Hypoalbuminemia: Lowest albumin = 2.8 g/dL at 1/17/2025  5:49 AM, will monitor as appropriate   # Thrombocytopenia: Lowest platelets = 115 in last 2 days, will monitor for bleeding   # Hypertension: Noted on problem list            # Overweight: Estimated body mass index is 25.14 kg/m  as calculated from the following:    Height as of this encounter: 1.778 m (5' 10\").    Weight as of this encounter: 79.5 kg (175 lb 3.2 oz)., PRESENT ON ADMISSION  # Moderate Malnutrition: based on nutrition assessment, PRESENT ON ADMISSION            Soledad Gibbons MD  [facility]ist  [facility] Medicine  [facility]  Phone: #[phone]  Securely message with the Vocera Web Console (learn more here)  Text page via [email or web address] Paging/Directory     Interval History/Subjective:  Abdominal pain is improved.  Tolerated p.o. well.  Had stool incontinence during sleep.  Afebrile.  Wants to continue CD treatment as outpatient.  Encouraged to have lymph node biopsy.    Physical Exam/Objective:  Temp:  [97  F (36.1  C)-98.7  F (37.1  C)] 98.3  F (36.8  C)  Pulse:  [] 103  Resp:  " [18-20] 20  BP: (117-131)/() 124/77  SpO2:  [95 %-98 %] 97 %  Body mass index is 25.14 kg/m .    GENERAL:  Alert, appears comfortable, in no acute distress, appears stated age   HEAD:  Normocephalic, without obvious abnormality, atraumatic   EYES:  PERRL, conjunctiva  clear,  EOM's intact   NOSE: Nares normal,  mucosa normal, no drainage   THROAT: Lips, mucosa,  gums normal, mouth moist   NECK: Supple, symmetrical, trachea midline   BACK:   Symmetric, no curvature, ROM normal   LUNGS:   Clear to auscultation bilaterally, no rales, rhonchi, or wheezing, symmetric chest rise on inhalation, respirations unlabored   CHEST WALL:  No tenderness or deformity   HEART:  Regular rate and rhythm, S1 and S2 normal, no murmur    ABDOMEN:   Soft, non-tender, bowel sounds active , no masses, no organomegaly, no rebound or guarding   EXTREMITIES: No edema    SKIN: No rashes in the visualized areas   NEURO: Alert, oriented x3, moves all four extremities freely   PSYCH: Cooperative, behavior is appropriate      Data reviewed today: I personally reviewed all new medications, labs, imaging/diagnostics reports over the past 24 hours. Pertinent findings include:    Imaging:   Abdomen US  1. Hepatic steatosis.  2. Normal gallbladder without cholelithiasis.  3. Findings of pancreatitis and retrocaval adenopathy are better evaluated on   same-day CT.    Abdomen CT  1. Acute pancreatitis: There is a moderate to extensive amount of peripancreatic stranding/edema.  2. No other cause of acute pain identified in the abdomen or pelvis.   3. 3 cm soft tissue nodule in the right side of the retroperitoneum at the level of the kidneys, significantly increased in size since studies dating back to 10/24/2021. This most likely represents an enlarged lymph node. There are a few additional   borderline and mildly enlarged lymph nodes in this region that are also more prominent. These are nonspecific, but suspicious for neoplasm. The larger nodule  is likely amenable to percutaneous imaging-guided biopsy.    Labs:  Most Recent 3 CBC's:  Recent Labs   Lab Test 01/17/25  0549 01/16/25  0545 01/15/25  2335   WBC 8.4 13.3* 15.4*   HGB 13.3 18.0* 19.4*   MCV 89 88 88   * 183 229     Most Recent 3 BMP's:  Recent Labs   Lab Test 01/17/25  0549 01/16/25  0545 01/16/25  0138   * 128* 129*   POTASSIUM 4.2 3.7 4.1   CHLORIDE 93* 90* 87*   CO2 21* 17* 18*   BUN 24.0* 30.6* 29.5*   CR 1.20* 1.91* 2.02*   ANIONGAP 14 21* 24*   NAIMA 7.9* 8.2* 8.6*   * 168* 185*     Most Recent 2 LFT's:  Recent Labs   Lab Test 01/17/25  0549 01/16/25  0545   * 187*   * 159*   ALKPHOS 96 129   BILITOTAL 2.3* 2.2*       Medications:   Personally Reviewed.  Medications   Current Facility-Administered Medications   Medication Dose Route Frequency Provider Last Rate Last Admin    lactated ringers infusion   Intravenous Continuous Soledad Gibbons MD 75 mL/hr at 01/17/25 1049 New Bag at 01/17/25 1049     Current Facility-Administered Medications   Medication Dose Route Frequency Provider Last Rate Last Admin    amLODIPine (NORVASC) tablet 5 mg  5 mg Oral Daily Felisa Lyon DO   5 mg at 01/17/25 0823    escitalopram (LEXAPRO) tablet 20 mg  20 mg Oral Daily Felisa Lyon DO   20 mg at 01/17/25 0822    folic acid injection 1 mg  1 mg Intravenous Daily Justin Tang MD   1 mg at 01/17/25 1050    multivitamin w/minerals (THERA-VIT-M) tablet 1 tablet  1 tablet Oral Daily Justin Tang MD   1 tablet at 01/17/25 0822    pantoprazole (PROTONIX) EC tablet 40 mg  40 mg Oral Daily Felisa Lyon DO   40 mg at 01/17/25 0823    sodium chloride (PF) 0.9% PF flush 3 mL  3 mL Intracatheter Q8H Ulysses Bell MD        thiamine (B-1) injection 100 mg  100 mg Intravenous Daily Justin Tang MD   100 mg at 01/17/25 0822      Total time spent 50 min

## 2025-01-17 NOTE — CONSULTS
Care Management Initial Consult    General Information  Assessment completed with: Patient,    Type of CM/SW Visit: Initial Assessment    Primary Care Provider verified and updated as needed: Yes   Readmission within the last 30 days: no previous admission in last 30 days      Reason for Consult: substance use concerns  Advance Care Planning: Advance Care Planning Reviewed: no concerns identified          Communication Assessment  Patient's communication style: spoken language (English or Bilingual)             Cognitive  Cognitive/Neuro/Behavioral: .WDL except, mood/behavior  Level of Consciousness: alert  Arousal Level: opens eyes spontaneously  Orientation: oriented x 4  Mood/Behavior: anxious  Best Language: 0 - No aphasia  Speech: spontaneous, clear, logical    Living Environment:   People in home: other (see comments) (Roommates)     Current living Arrangements: house      Able to return to prior arrangements: yes       Family/Social Support:  Care provided by: self  Provides care for: no one  Marital Status:   Support system: Friend          Description of Support System: Supportive         Current Resources:   Patient receiving home care services: No        Community Resources: None  Equipment currently used at home: none  Supplies currently used at home: None    Employment/Financial:  Employment Status: employed full-time        Financial Concerns: none   Referral to Financial Worker: No       Does the patient's insurance plan have a 3 day qualifying hospital stay waiver?  No    Lifestyle & Psychosocial Needs:  Social Drivers of Health     Food Insecurity: Not on file   Depression: Not on file   Housing Stability: Not on file   Tobacco Use: Medium Risk (4/9/2024)    Received from [employer]    Patient History     Smoking Tobacco Use: Never     Smokeless Tobacco Use: Never     Passive Exposure: Current   Financial Resource Strain: Not on file   Alcohol Use: Not on file   Transportation Needs: Not  on file   Physical Activity: Not on file   Interpersonal Safety: Not on file   Stress: Not on file   Social Connections: Not on file   Health Literacy: Not on file       Functional Status:  Prior to admission patient needed assistance:   Dependent ADLs:: Independent  Dependent IADLs:: Independent       Mental Health Status:  Mental Health Status: No Current Concerns       Chemical Dependency Status:  Chemical Dependency Status: Current Concern             Values/Beliefs:  Spiritual, Cultural Beliefs, Taoist Practices, Values that affect care: no               Discussed  Partnership in Safe Discharge Planning  document with patient/family: Yes:     Additional Information:  SWCM met and introduced self and CM service to Pt.  Pt lives in a house and rents a room and has roommates.  Pt independent at baseline. Pt works full time.  Pt met with  [facility]. SWCM gave Pt the letter from [facility] with recommendations for IOP.  Pt will look at it and SWCM encouraged Pt to call places and check for openings.      Next Steps: Discharge home when Pt is medically ready.     MERLENE Nielsen

## 2025-01-17 NOTE — PLAN OF CARE
Goal Outcome Evaluation: ciwa AT 0100 8, valium given, CIWA improved. Dilaudid given x1 for abdominal pain. LR at 150. Alarm in use for safety. Uses call light appropriately. Full liquid diet. Sinus Rhythm on tele.       Problem: Adult Inpatient Plan of Care  Goal: Optimal Comfort and Wellbeing  Outcome: Progressing  Intervention: Monitor Pain and Promote Comfort  Recent Flowsheet Documentation  Taken 1/16/2025 2340 by Jennie Trivedi RN  Pain Management Interventions: heat applied     Problem: Alcohol Withdrawal  Goal: Alcohol Withdrawal Symptom Control  1/17/2025 0358 by Jennie Trivedi RN  Outcome: Progressing  1/17/2025 0357 by Jennie Trivedi RN  Outcome: Progressing  Goal: Optimal Neurologic Function  1/17/2025 0358 by Jennie Trivedi RN  Outcome: Progressing  1/17/2025 0357 by Jennie Trivedi RN  Outcome: Progressing     Problem: Pancreatitis  Goal: Optimal Pain Control and Function  Outcome: Progressing  Intervention: Monitor and Manage Pain  Recent Flowsheet Documentation  Taken 1/16/2025 2340 by Jennie Trivedi RN  Pain Management Interventions: heat applied

## 2025-01-17 NOTE — PROGRESS NOTES
[facility]  [address] [place] [address]., [place] [address]      1/17/2025      Luiz Herbert  [address] [facility] [address]      Dear Mr. Herbert,    Here are some programs to look into.  Since you completed a ROVERTO Comprehensive Assessment with me, you can let me know which program you would like to attend, and I will fax your assessment to the program of choice for you to get started. I will need you to sign a Release of Information before I fax your assessment. This Release of Information can be sent to you via XO1 (electronic) or via the Unit . If you have questions, my phone number is [phone].    Thanks,  Ena CAROLINA [facility] IOP:  Coordinator: Taylor Medeiros  Phone: [phone]  email: [email or web address]  [email or web address]-Use   [facility] Evening IOP: M,T, Th 5:30-7:30 W 5:30-8:30    [facility]: (in-person & virtual)  Main phone: [phone]  Direct Dial: [phone]   Admissions email: [email or web address]   ROVERTO fax: [phone]  www.[employer] [address] [address]  Phone: [phone]  fax: [phone]  email: [email or web address]  www.[employer]    Norris Richard  Phone: [phone]  Fax: [phone]  email: [email or web address]  [email or web address]/    Dmoonique Hoover MA [facility]  CD Evaluation Counselor  [phone]    (Emailed to pt and unit SW today)

## 2025-01-17 NOTE — CONSULTS
1/17/2025  Pt completed his ROVERTO CA today. He is interested in IOP ROVERTO treatment at this time. I emailed him the below programs to look over and to let me know which one he wants to attend by 4pm today.    Next Steps:  1) pt picks a tx program.  2) pt informs me or the unit sw which tx program he wants to go to.  3) pt signs STAR for tx program.  4) either myself or unit SW sends ROVERTO CA to tx program.    [facility] Date ID: [id number]    Recommendations:   1)  Complete an Intensive Outpatient CD Treatment Program.  2)  Abstain from all mood-altering chemicals unless prescribed by a licensed provider.   3)  Attend, at minimum, 2 weekly support group meetings, such as 12 step based (AA/NA), SMART Recovery, Health Realizations, and/or Refuge Recovery meetings.     4)  Actively work with a male mentor/sponsor on a weekly basis.   5)  Follow all the recommendations of your treatment/medical providers.    Clinical Substantiation:    Pt reports his drinking increased in his 40s. For the past year, he has been drinking about a 750ml bottle of vodka per day. He has never attended a ROVERTO treatment before. He is internally motivated for ROVERTO treatment and to stop drinking    Referrals/ Alternatives:  [facility] IOP:  Coordinator: Taylor Medeiros  Phone: [phone]  email: [email or web address]  [email or web address]-Use   [facility] Evening IOP: M,T, Th 5:30-7:30 W 5:30-8:30    [facility]: (in-person & virtual)  Main phone: [phone]  Direct Dial: [phone]   Admissions email: [email or web address]   ROVERTO fax: [phone]  www.[employer]  [address] [place] [address]  Phone: [phone]  fax: [phone]  email: [email or web address]  [email or web address]    Norris Richard  Phone: [phone]  Fax: [phone]  email:  [email or web address]  [email or web address]    ROVERTO consult completed by: VALENTINA Garcia.  Phone Number: [phone]  E-mail Address: [email or web address] Evaluation Department  [address] [place] [address]     *Due to regulation of Title 42 of the Code of Federal Regulations (CFR) Part 2: Confidentiality laws apply to this note and the information wherein.  Thus, this note cannot be copy and pasted into any other health care staff's note nor can it be included in general medical records sent to ANY outside agency without the patient's written consent.

## 2025-01-17 NOTE — PLAN OF CARE
Problem: Alcohol Withdrawal  Goal: Alcohol Withdrawal Symptom Control  Outcome: Progressing     Problem: Alcohol Withdrawal  Goal: Optimal Neurologic Function  Outcome: Progressing     Pt sleeping between cares, no events. Pt reporting fluctuating levels of anxiety, CIWA scores of 2-14 this shift, valium is effective at treating symptoms per pt. Also reporting 10/10 abdominal pain. Stated he did not care for the after effects of fentanyl, switched to IV dilaudid which he reports adequate pain control. Poor appetite, but tolerating full liquid diet. Plan to meet with chem dep tomorrow.
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
## 2025-01-17 NOTE — PLAN OF CARE
Goal Outcome Evaluation:      Plan of Care Reviewed With: patient    Overall Patient Progress: improvingOverall Patient Progress: improving    Outcome Evaluation: Pt will return home and look into IOP Treatment.

## 2025-01-17 NOTE — CONSULTS
Recommend follow up MRI with IV contrast to further characterize the right abdominal nodule before proceeding to biopsy. Additionally, recommend resolution of acute pancreatitis before proceeding to MRI and before biopsy.

## 2025-01-18 LAB
ANION GAP SERPL CALCULATED.3IONS-SCNC: 13 MMOL/L (ref 7–15)
BUN SERPL-MCNC: 13.9 MG/DL (ref 6–20)
CALCIUM SERPL-MCNC: 8.5 MG/DL (ref 8.8–10.4)
CHLORIDE SERPL-SCNC: 95 MMOL/L (ref 98–107)
CREAT SERPL-MCNC: 0.9 MG/DL (ref 0.67–1.17)
EGFRCR SERPLBLD CKD-EPI 2021: >90 ML/MIN/1.73M2
ERYTHROCYTE [DISTWIDTH] IN BLOOD BY AUTOMATED COUNT: 14.7 % (ref 10–15)
GLUCOSE SERPL-MCNC: 94 MG/DL (ref 70–99)
HCO3 SERPL-SCNC: 24 MMOL/L (ref 22–29)
HCT VFR BLD AUTO: 33.4 % (ref 40–53)
HGB BLD-MCNC: 11.6 G/DL (ref 13.3–17.7)
LIPASE SERPL-CCNC: 115 U/L (ref 13–60)
LIPASE SERPL-CCNC: 138 U/L (ref 13–60)
MAGNESIUM SERPL-MCNC: 2.4 MG/DL (ref 1.7–2.3)
MCH RBC QN AUTO: 32 PG (ref 26.5–33)
MCHC RBC AUTO-ENTMCNC: 34.7 G/DL (ref 31.5–36.5)
MCV RBC AUTO: 92 FL (ref 78–100)
PHOSPHATE SERPL-MCNC: 1.9 MG/DL (ref 2.5–4.5)
PHOSPHATE SERPL-MCNC: 2.8 MG/DL (ref 2.5–4.5)
PLATELET # BLD AUTO: 93 10E3/UL (ref 150–450)
POTASSIUM SERPL-SCNC: 4.4 MMOL/L (ref 3.4–5.3)
RBC # BLD AUTO: 3.62 10E6/UL (ref 4.4–5.9)
SODIUM SERPL-SCNC: 132 MMOL/L (ref 135–145)
WBC # BLD AUTO: 7.8 10E3/UL (ref 4–11)

## 2025-01-18 PROCEDURE — 250N000013 HC RX MED GY IP 250 OP 250 PS 637: Performed by: INTERNAL MEDICINE

## 2025-01-18 PROCEDURE — 85041 AUTOMATED RBC COUNT: CPT | Performed by: INTERNAL MEDICINE

## 2025-01-18 PROCEDURE — 84100 ASSAY OF PHOSPHORUS: CPT | Performed by: FAMILY MEDICINE

## 2025-01-18 PROCEDURE — 36415 COLL VENOUS BLD VENIPUNCTURE: CPT | Performed by: FAMILY MEDICINE

## 2025-01-18 PROCEDURE — 83690 ASSAY OF LIPASE: CPT | Performed by: FAMILY MEDICINE

## 2025-01-18 PROCEDURE — 84100 ASSAY OF PHOSPHORUS: CPT | Performed by: INTERNAL MEDICINE

## 2025-01-18 PROCEDURE — 85014 HEMATOCRIT: CPT | Performed by: INTERNAL MEDICINE

## 2025-01-18 PROCEDURE — 83735 ASSAY OF MAGNESIUM: CPT | Performed by: INTERNAL MEDICINE

## 2025-01-18 PROCEDURE — 36415 COLL VENOUS BLD VENIPUNCTURE: CPT | Performed by: INTERNAL MEDICINE

## 2025-01-18 PROCEDURE — 250N000011 HC RX IP 250 OP 636: Performed by: INTERNAL MEDICINE

## 2025-01-18 PROCEDURE — 99232 SBSQ HOSP IP/OBS MODERATE 35: CPT | Performed by: FAMILY MEDICINE

## 2025-01-18 PROCEDURE — 80048 BASIC METABOLIC PNL TOTAL CA: CPT | Performed by: INTERNAL MEDICINE

## 2025-01-18 PROCEDURE — 250N000013 HC RX MED GY IP 250 OP 250 PS 637: Performed by: EMERGENCY MEDICINE

## 2025-01-18 PROCEDURE — 83690 ASSAY OF LIPASE: CPT | Performed by: INTERNAL MEDICINE

## 2025-01-18 PROCEDURE — 250N000013 HC RX MED GY IP 250 OP 250 PS 637: Performed by: FAMILY MEDICINE

## 2025-01-18 PROCEDURE — 120N000001 HC R&B MED SURG/OB

## 2025-01-18 RX ORDER — HYDROMORPHONE HYDROCHLORIDE 1 MG/ML
0.5 INJECTION, SOLUTION INTRAMUSCULAR; INTRAVENOUS; SUBCUTANEOUS ONCE
Status: COMPLETED | OUTPATIENT
Start: 2025-01-18 | End: 2025-01-18

## 2025-01-18 RX ORDER — FOLIC ACID 1 MG/1
1 TABLET ORAL DAILY
Status: DISCONTINUED | OUTPATIENT
Start: 2025-01-18 | End: 2025-01-19 | Stop reason: HOSPADM

## 2025-01-18 RX ORDER — METOPROLOL SUCCINATE 25 MG/1
25 TABLET, EXTENDED RELEASE ORAL DAILY
Status: DISCONTINUED | OUTPATIENT
Start: 2025-01-18 | End: 2025-01-19

## 2025-01-18 RX ADMIN — OXYCODONE 5 MG: 5 TABLET ORAL at 22:29

## 2025-01-18 RX ADMIN — POTASSIUM & SODIUM PHOSPHATES POWDER PACK 280-160-250 MG 2 PACKET: 280-160-250 PACK at 09:08

## 2025-01-18 RX ADMIN — ACETAMINOPHEN 650 MG: 325 TABLET ORAL at 06:00

## 2025-01-18 RX ADMIN — OXYCODONE 5 MG: 5 TABLET ORAL at 06:00

## 2025-01-18 RX ADMIN — PANTOPRAZOLE SODIUM 40 MG: 40 TABLET, DELAYED RELEASE ORAL at 09:08

## 2025-01-18 RX ADMIN — POTASSIUM & SODIUM PHOSPHATES POWDER PACK 280-160-250 MG 2 PACKET: 280-160-250 PACK at 13:09

## 2025-01-18 RX ADMIN — Medication 100 MG: at 09:08

## 2025-01-18 RX ADMIN — ESCITALOPRAM OXALATE 20 MG: 20 TABLET ORAL at 09:08

## 2025-01-18 RX ADMIN — HYDROMORPHONE HYDROCHLORIDE 0.5 MG: 1 INJECTION, SOLUTION INTRAMUSCULAR; INTRAVENOUS; SUBCUTANEOUS at 04:05

## 2025-01-18 RX ADMIN — Medication 1 TABLET: at 09:08

## 2025-01-18 RX ADMIN — OXYCODONE 5 MG: 5 TABLET ORAL at 16:32

## 2025-01-18 RX ADMIN — METOPROLOL SUCCINATE 25 MG: 25 TABLET, EXTENDED RELEASE ORAL at 13:09

## 2025-01-18 RX ADMIN — POTASSIUM & SODIUM PHOSPHATES POWDER PACK 280-160-250 MG 2 PACKET: 280-160-250 PACK at 18:07

## 2025-01-18 RX ADMIN — ACETAMINOPHEN 650 MG: 325 TABLET ORAL at 18:07

## 2025-01-18 RX ADMIN — OXYCODONE 5 MG: 5 TABLET ORAL at 12:05

## 2025-01-18 RX ADMIN — FOLIC ACID 1 MG: 1 TABLET ORAL at 09:08

## 2025-01-18 ASSESSMENT — ACTIVITIES OF DAILY LIVING (ADL)
ADLS_ACUITY_SCORE: 52

## 2025-01-18 NOTE — PLAN OF CARE
Goal Outcome Evaluation:    Problem: Adult Inpatient Plan of Care  Goal: Plan of Care Review  Description: The Plan of Care Review/Shift note should be completed every shift.  The Outcome Evaluation is a brief statement about your assessment that the patient is improving, declining, or no change.  This information will be displayed automatically on your shift  note.  Outcome: Progressing  Flowsheets (Taken 1/17/2025 1850)  Plan of Care Reviewed With: patient  Overall Patient Progress: improving  VSS on RA. Pt is alert and oriented. Calls appropriately for needs. IND.  Pt NPO for biopsy. IV fluids running. Voids spontaneously. Pain controlled with oral medications. K, Mg, and Phos AM checks

## 2025-01-18 NOTE — PROGRESS NOTES
[facility] MEDICINE PROGRESS NOTE      Identification/Summary: Luiz Herbert is a 49 year old male with a past medical history of alcohol abuse, dependence, anxiety, depression, GERD, came with abdominal pain with nausea and vomiting.  He admits drinking alcohol heavily for last 4 years.  Found to have alcoholic pancreatitis with lipase 2078.  He responded with bowel rest, IV hydration.  Diet was advanced and tolerated well.  Absolute alcohol cessation is advised.  He had severe alcohol withdrawal symptoms with seizures.  [facility] protocol utilized.  Alcohol withdrawal symptoms are much improved.  Evaluated by chemical dependency.  Complete an intensive outpatient CD treatment program.  Resources are provided.  Electrolytes are replaced.  Continue folic acid and thiamine supplement.    He had an incidental finding of right retroperitoneal lymph node measuring 3 cm on CT scan which is slightly increased in size since 10/2021.  Few additional borderline and mildly enlarged lymph node in that region. Abdomen MRI with contrast for more evaluation in 1 week when acute pancreatitis is completely resolved.  Could be reactive lymphadenopathy from recent pancreatitis.  Biopsy after MRI scan.    Assessment and Plan:  Alcohol intoxication  Alcohol abuse and dependence  Severe alcohol withdrawal  Alcohol withdrawal seizure on admission, no further recurrence  Absolute alcohol cessation  Patient admits drinking alcohol heavily for last 4 years  CIWA protocol utilized  Alcohol withdrawal symptoms are much improved  Folic acid and thiamine supplement  Evaluated by chemical dependency  Will complete outpatient treatment    Alcohol induced pancreatitis  Improved with bowel rest and IV hydration  Tolerating p.o. well  CT-acute pancreatitis with moderate extensive amount of pancreatic stranding/edema    Anion gap metabolic acidosis secondary to dehydration, corrected    Acute kidney injury.  Creatinine  "2.5-->1.2    Hyponatremia, sodium 128  Likely from chronic alcohol use    Alcoholic hepatitis  --> 134,  -->104, direct bilirubin 1.17  CT-moderate diffuse fatty infiltration of liver  Ultrasound-normal gallbladder without cholelithiasis    Elevated blood pressure secondary to alcohol withdrawal versus hypertension  Metoprolol XL 25 mg daily    Right retroperitoneal lymphadenopathy  Could be reactive from recent pancreatitis  3 cm soft tissue nodule in right side of retroperitoneum  MRI scan of abdomen in  1 week  Consider biopsy if lymphadenopathy is still present    Anxiety and depression  Lexapro 20 mg daily    GERD, resume PPI    Code full  DVT prophylaxis  Early ambulation and SCDs    Medically Ready for Discharge: Anticipated Tomorrow        Clinically Significant Risk Factors         # Hyponatremia: Lowest Na = 128 mmol/L in last 2 days, will monitor as appropriate  # Hypochloremia: Lowest Cl = 93 mmol/L in last 2 days, will monitor as appropriate      # Hypoalbuminemia: Lowest albumin = 2.8 g/dL at 1/17/2025  5:49 AM, will monitor as appropriate   # Thrombocytopenia: Lowest platelets = 93 in last 2 days, will monitor for bleeding   # Hypertension: Noted on problem list            # Overweight: Estimated body mass index is 25.18 kg/m  as calculated from the following:    Height as of this encounter: 1.778 m (5' 10\").    Weight as of this encounter: 79.6 kg (175 lb 7.8 oz)., PRESENT ON ADMISSION  # Moderate Malnutrition: based on nutrition assessment, PRESENT ON ADMISSION   # Financial/Environmental Concerns: none           Soledad Gibbons MD  [facility]  Phone: #[phone]  Securely message with the Vocera Web Console (learn more here)  Text page via [employer] Paging/Directory     Interval History/Subjective:  Abdominal pain is improved.  Tolerated p.o. well.  Had stool incontinence during sleep.  Afebrile.  Wants to continue CD treatment as " outpatient.  Encouraged to have lymph node biopsy.    Physical Exam/Objective:  Temp:  [97.9  F (36.6  C)-98.1  F (36.7  C)] 97.9  F (36.6  C)  Pulse:  [62-77] 77  Resp:  [16-20] 18  BP: (115-149)/(75-96) 144/95  SpO2:  [95 %-97 %] 97 %  Body mass index is 25.18 kg/m .    GENERAL:  Alert, appears comfortable, in no acute distress, appears stated age   HEAD:  Normocephalic, without obvious abnormality, atraumatic   EYES:  PERRL, conjunctiva  clear,  EOM's intact   NOSE: Nares normal,  mucosa normal, no drainage   THROAT: Lips, mucosa,  gums normal, mouth moist   NECK: Supple, symmetrical, trachea midline   BACK:   Symmetric, no curvature, ROM normal   LUNGS:   Clear to auscultation bilaterally, no rales, rhonchi, or wheezing, symmetric chest rise on inhalation, respirations unlabored   CHEST WALL:  No tenderness or deformity   HEART:  Regular rate and rhythm, S1 and S2 normal, no murmur    ABDOMEN:   Soft, non-tender, bowel sounds active , no masses, no organomegaly, no rebound or guarding   EXTREMITIES: No edema    SKIN: No rashes in the visualized areas   NEURO: Alert, oriented x3, moves all four extremities freely   PSYCH: Cooperative, behavior is appropriate      Data reviewed today: I personally reviewed all new medications, labs, imaging/diagnostics reports over the past 24 hours. Pertinent findings include:    Imaging:   Abdomen US  1. Hepatic steatosis.  2. Normal gallbladder without cholelithiasis.  3. Findings of pancreatitis and retrocaval adenopathy are better evaluated on   same-day CT.    Abdomen CT  1. Acute pancreatitis: There is a moderate to extensive amount of peripancreatic stranding/edema.  2. No other cause of acute pain identified in the abdomen or pelvis.   3. 3 cm soft tissue nodule in the right side of the retroperitoneum at the level of the kidneys, significantly increased in size since studies dating back to 10/24/2021. This most likely represents an enlarged lymph node. There are a few  additional   borderline and mildly enlarged lymph nodes in this region that are also more prominent. These are nonspecific, but suspicious for neoplasm. The larger nodule is likely amenable to percutaneous imaging-guided biopsy.    Labs:  Most Recent 3 CBC's:  Recent Labs   Lab Test 01/18/25  0636 01/17/25  0549 01/16/25  0545   WBC 7.8 8.4 13.3*   HGB 11.6* 13.3 18.0*   MCV 92 89 88   PLT 93* 115* 183     Most Recent 3 BMP's:  Recent Labs   Lab Test 01/18/25  0636 01/17/25  0549 01/16/25  0545   * 128* 128*   POTASSIUM 4.4 4.2 3.7   CHLORIDE 95* 93* 90*   CO2 24 21* 17*   BUN 13.9 24.0* 30.6*   CR 0.90 1.20* 1.91*   ANIONGAP 13 14 21*   NAIMA 8.5* 7.9* 8.2*   GLC 94 120* 168*     Most Recent 2 LFT's:  Recent Labs   Lab Test 01/17/25  0549 01/16/25  0545   * 187*   * 159*   ALKPHOS 96 129   BILITOTAL 2.3* 2.2*       Medications:   Personally Reviewed.  Medications   Current Facility-Administered Medications   Medication Dose Route Frequency Provider Last Rate Last Admin     Current Facility-Administered Medications   Medication Dose Route Frequency Provider Last Rate Last Admin    escitalopram (LEXAPRO) tablet 20 mg  20 mg Oral Daily Felisa Lyon DO   20 mg at 01/18/25 0908    folic acid (FOLVITE) tablet 1 mg  1 mg Oral Daily Soledad Gibbons MD   1 mg at 01/18/25 0908    multivitamin w/minerals (THERA-VIT-M) tablet 1 tablet  1 tablet Oral Daily Justin Tang MD   1 tablet at 01/18/25 0908    pantoprazole (PROTONIX) EC tablet 40 mg  40 mg Oral Daily Felisa Lyon DO   40 mg at 01/18/25 0908    potassium & sodium phosphates (NEUTRA-PHOS) Packet 2 packet  2 packet Oral or Feeding Tube Q4H Soledad Gibbons MD   2 packet at 01/18/25 0908    sodium chloride (PF) 0.9% PF flush 3 mL  3 mL Intracatheter Q8H Asumeng, Ulysses, MD        thiamine (B-1) tablet 100 mg  100 mg Oral Daily Soledad Gibbons MD   100 mg at 01/18/25 0908      Total time spent 50 min

## 2025-01-18 NOTE — PLAN OF CARE
Patient up ambulated in room independently. Denies chest pain or shortness of breath. Complained of intermittent abdominal pain. Available PRN pain medication given. VSS. LS clear. Tolerating diet. Voiding spontaneously. Plan discharge tomorrow, MRI out patient. Will monitor and continue plan of care.     Problem: Pancreatitis  Goal: Optimal Pain Control and Function  Outcome: Progressing     Problem: Pancreatitis  Goal: Fluid and Electrolyte Balance  Outcome: Progressing     Problem: Alcohol Withdrawal  Goal: Alcohol Withdrawal Symptom Control  Outcome: Progressing     Problem: Adult Inpatient Plan of Care  Goal: Absence of Hospital-Acquired Illness or Injury  Intervention: Prevent Skin Injury  Recent Flowsheet Documentation  Taken 1/18/2025 0888 by Catina Tijreina, RN  Body Position: position changed independently

## 2025-01-18 NOTE — PLAN OF CARE
Goal Outcome Evaluation:      Plan of Care Reviewed With: patient    Overall Patient Progress: improvingOverall Patient Progress: improving    Outcome Evaluation: Pt A/Ox4. VSS. RA. Pt c/o pain LUQ. Pain managed w PRN meds and 1x dose of dilaudid. CIWA score 0. Pt resting, continue to monitor.

## 2025-01-18 NOTE — SIGNIFICANT EVENT
Significant Event Note    Time of event: 3:06 AM January 18, 2025    Description of event:  Nursing staff reports that patient had breakthrough pain and requesting for Dilaudid.  Chart briefly reviewed.    Plan:  I have ordered one-time dose of Dilaudid.  I have made him n.p.o.  Ordered repeat lipase.    Discussed with: bedside nurse    Ulysses Bell MD

## 2025-01-19 VITALS
OXYGEN SATURATION: 97 % | TEMPERATURE: 98.2 F | HEART RATE: 76 BPM | WEIGHT: 175.49 LBS | RESPIRATION RATE: 16 BRPM | BODY MASS INDEX: 25.12 KG/M2 | HEIGHT: 70 IN | DIASTOLIC BLOOD PRESSURE: 110 MMHG | SYSTOLIC BLOOD PRESSURE: 149 MMHG

## 2025-01-19 LAB
ALBUMIN SERPL BCG-MCNC: 3.2 G/DL (ref 3.5–5.2)
ALP SERPL-CCNC: 131 U/L (ref 40–150)
ALT SERPL W P-5'-P-CCNC: 95 U/L (ref 0–70)
ANION GAP SERPL CALCULATED.3IONS-SCNC: 14 MMOL/L (ref 7–15)
AST SERPL W P-5'-P-CCNC: 72 U/L (ref 0–45)
BILIRUB SERPL-MCNC: 1.9 MG/DL
BUN SERPL-MCNC: 8.5 MG/DL (ref 6–20)
CALCIUM SERPL-MCNC: 9 MG/DL (ref 8.8–10.4)
CHLORIDE SERPL-SCNC: 92 MMOL/L (ref 98–107)
CREAT SERPL-MCNC: 0.7 MG/DL (ref 0.67–1.17)
EGFRCR SERPLBLD CKD-EPI 2021: >90 ML/MIN/1.73M2
GLUCOSE BLDC GLUCOMTR-MCNC: 118 MG/DL (ref 70–99)
GLUCOSE SERPL-MCNC: 102 MG/DL (ref 70–99)
HCO3 SERPL-SCNC: 26 MMOL/L (ref 22–29)
HOLD SPECIMEN: NORMAL
MAGNESIUM SERPL-MCNC: 2.3 MG/DL (ref 1.7–2.3)
PHOSPHATE SERPL-MCNC: 2.8 MG/DL (ref 2.5–4.5)
POTASSIUM SERPL-SCNC: 4.2 MMOL/L (ref 3.4–5.3)
PROT SERPL-MCNC: 6.5 G/DL (ref 6.4–8.3)
SODIUM SERPL-SCNC: 132 MMOL/L (ref 135–145)

## 2025-01-19 PROCEDURE — 82947 ASSAY GLUCOSE BLOOD QUANT: CPT | Performed by: FAMILY MEDICINE

## 2025-01-19 PROCEDURE — 250N000011 HC RX IP 250 OP 636: Performed by: FAMILY MEDICINE

## 2025-01-19 PROCEDURE — 84100 ASSAY OF PHOSPHORUS: CPT | Performed by: FAMILY MEDICINE

## 2025-01-19 PROCEDURE — 82565 ASSAY OF CREATININE: CPT | Performed by: FAMILY MEDICINE

## 2025-01-19 PROCEDURE — 250N000013 HC RX MED GY IP 250 OP 250 PS 637: Performed by: FAMILY MEDICINE

## 2025-01-19 PROCEDURE — 99239 HOSP IP/OBS DSCHRG MGMT >30: CPT | Performed by: FAMILY MEDICINE

## 2025-01-19 PROCEDURE — 250N000013 HC RX MED GY IP 250 OP 250 PS 637: Performed by: INTERNAL MEDICINE

## 2025-01-19 PROCEDURE — 83735 ASSAY OF MAGNESIUM: CPT | Performed by: FAMILY MEDICINE

## 2025-01-19 PROCEDURE — 36415 COLL VENOUS BLD VENIPUNCTURE: CPT | Performed by: FAMILY MEDICINE

## 2025-01-19 PROCEDURE — 84460 ALANINE AMINO (ALT) (SGPT): CPT | Performed by: FAMILY MEDICINE

## 2025-01-19 PROCEDURE — 250N000013 HC RX MED GY IP 250 OP 250 PS 637: Performed by: EMERGENCY MEDICINE

## 2025-01-19 RX ORDER — METOPROLOL SUCCINATE 50 MG/1
50 TABLET, EXTENDED RELEASE ORAL DAILY
Status: DISCONTINUED | OUTPATIENT
Start: 2025-01-20 | End: 2025-01-19 | Stop reason: HOSPADM

## 2025-01-19 RX ORDER — METOPROLOL SUCCINATE 50 MG/1
50 TABLET, EXTENDED RELEASE ORAL DAILY
Qty: 30 TABLET | Refills: 1 | Status: SHIPPED | OUTPATIENT
Start: 2025-01-19

## 2025-01-19 RX ORDER — HYDRALAZINE HYDROCHLORIDE 20 MG/ML
10 INJECTION INTRAMUSCULAR; INTRAVENOUS ONCE
Status: COMPLETED | OUTPATIENT
Start: 2025-01-19 | End: 2025-01-19

## 2025-01-19 RX ORDER — LORAZEPAM 1 MG/1
1 TABLET ORAL ONCE
Status: COMPLETED | OUTPATIENT
Start: 2025-01-19 | End: 2025-01-19

## 2025-01-19 RX ORDER — LANOLIN ALCOHOL/MO/W.PET/CERES
100 CREAM (GRAM) TOPICAL DAILY
Qty: 30 TABLET | Refills: 0 | Status: SHIPPED | OUTPATIENT
Start: 2025-01-20

## 2025-01-19 RX ORDER — METOPROLOL SUCCINATE 25 MG/1
25 TABLET, EXTENDED RELEASE ORAL ONCE
Status: COMPLETED | OUTPATIENT
Start: 2025-01-19 | End: 2025-01-19

## 2025-01-19 RX ORDER — FOLIC ACID 1 MG/1
1 TABLET ORAL DAILY
Qty: 30 TABLET | Refills: 0 | Status: SHIPPED | OUTPATIENT
Start: 2025-01-20

## 2025-01-19 RX ORDER — IBUPROFEN 400 MG/1
400 TABLET, FILM COATED ORAL EVERY 6 HOURS PRN
COMMUNITY
Start: 2025-01-19

## 2025-01-19 RX ORDER — METOPROLOL SUCCINATE 50 MG/1
50 TABLET, EXTENDED RELEASE ORAL DAILY
Status: DISCONTINUED | OUTPATIENT
Start: 2025-01-19 | End: 2025-01-19

## 2025-01-19 RX ORDER — MULTIPLE VITAMINS W/ MINERALS TAB 9MG-400MCG
1 TAB ORAL DAILY
Qty: 90 TABLET | Refills: 0 | Status: SHIPPED | OUTPATIENT
Start: 2025-01-20

## 2025-01-19 RX ADMIN — ESCITALOPRAM OXALATE 20 MG: 20 TABLET ORAL at 08:39

## 2025-01-19 RX ADMIN — ACETAMINOPHEN 650 MG: 325 TABLET ORAL at 02:17

## 2025-01-19 RX ADMIN — FOLIC ACID 1 MG: 1 TABLET ORAL at 08:39

## 2025-01-19 RX ADMIN — OXYCODONE 5 MG: 5 TABLET ORAL at 02:18

## 2025-01-19 RX ADMIN — Medication 1 TABLET: at 08:39

## 2025-01-19 RX ADMIN — PANTOPRAZOLE SODIUM 40 MG: 40 TABLET, DELAYED RELEASE ORAL at 08:39

## 2025-01-19 RX ADMIN — ACETAMINOPHEN 650 MG: 325 TABLET ORAL at 12:30

## 2025-01-19 RX ADMIN — Medication 100 MG: at 08:39

## 2025-01-19 RX ADMIN — ALUMINUM HYDROXIDE, MAGNESIUM HYDROXIDE, AND SIMETHICONE 30 ML: 1200; 120; 1200 SUSPENSION ORAL at 12:32

## 2025-01-19 RX ADMIN — METOPROLOL SUCCINATE 25 MG: 25 TABLET, EXTENDED RELEASE ORAL at 10:51

## 2025-01-19 RX ADMIN — OXYCODONE 5 MG: 5 TABLET ORAL at 06:52

## 2025-01-19 RX ADMIN — OXYCODONE 2.5 MG: 5 TABLET ORAL at 15:58

## 2025-01-19 RX ADMIN — LORAZEPAM 1 MG: 1 TABLET ORAL at 12:28

## 2025-01-19 RX ADMIN — METOPROLOL SUCCINATE 25 MG: 25 TABLET, EXTENDED RELEASE ORAL at 08:39

## 2025-01-19 ASSESSMENT — ACTIVITIES OF DAILY LIVING (ADL)
ADLS_ACUITY_SCORE: 52

## 2025-01-19 NOTE — DISCHARGE SUMMARY
[facility] MEDICINE  DISCHARGE SUMMARY     Primary Care Physician: Taz Lewis  Admission Date: 1/15/2025   Discharge Provider: Soledad Gibbons MD Discharge Date: 1/19/2025   Diet:   Low salt diet   Code Status: Full Code   Activity: DCACTIVITY: Activity as tolerated        Condition at Discharge: Stable     REASON FOR PRESENTATION(See Admission Note for Details)   Alcohol intoxication and alcohol withdrawal seizure, abdominal pain    PRINCIPAL & ACTIVE DISCHARGE DIAGNOSES     Alcohol intoxication  Alcohol abuse and dependence  Severe alcohol withdrawal  Alcohol withdrawal seizure  Alcohol induced pancreatitis  Anion gap metabolic acidosis secondary to dehydration, corrected  Acute kidney injury, resolved  Hyponatremia  Alcoholic hepatitis  Essential hypertension  Right retroperitoneal lymphadenopathy  Thrombocytopenia secondary to alcohol use  Anxiety and depression  GERD    PENDING LABS     Unresulted Labs Ordered in the Past 30 Days of this Admission       No orders found from 12/16/2024 to 1/16/2025.          PROCEDURES ( this hospitalization only)      None    RECOMMENDATIONS TO OUTPATIENT PROVIDER FOR F/U VISIT     Follow-up Appointments       Follow-up and recommended labs and tests       Follow-up with primary MD in 1 week  Absolute alcohol cessation  Complete an intensive outpatient CD treatment program.  Abdomen MRI in a week  Retroperitoneal lymph node biopsy as needed  CBC, CMP in 1 week              DISPOSITION     Home    SUMMARY OF HOSPITAL COURSE:      Luiz Herbert is a 49 year old male with a past medical history of alcohol abuse, dependence, anxiety, depression, GERD, came with abdominal pain with nausea and vomiting.  He admits drinking alcohol heavily for last 4 years.  Found to have alcoholic pancreatitis with lipase 2078.  He responded with bowel rest, IV hydration.  Had acute kidney injury with creatinine 2.5, anion gap metabolic acidosis  secondary to dehydration which are corrected.  Diet was advanced and tolerated well.  Absolute alcohol cessation is advised.  He had severe alcohol withdrawal symptoms with seizures.  [facility] protocol utilized.  Alcohol withdrawal symptoms are resolved prior to discharge.  Evaluated by chemical dependency.  Complete an intensive outpatient CD treatment program.  Resources are provided.  Electrolytes are replaced.  Continue folic acid and thiamine supplement.     He had an incidental finding of right retroperitoneal lymph node measuring 3 cm on CT scan which is slightly increased in size since 10/2021.  Few additional borderline and mildly enlarged lymph node in that region. Abdomen MRI with contrast for more evaluation in 1 week when acute pancreatitis is completely resolved.  Could be reactive lymphadenopathy from recent pancreatitis.  Biopsy after MRI scan.  Evaluated by interventional radiology.    Blood pressure is on the higher side.  Started on metoprolol XL 50 mg daily.  Hemoglobin A1c 6.  Monitor closely as outpatient.    Discharge Medications with Med changes:     Current Discharge Medication List        START taking these medications    Details   folic acid (FOLVITE) 1 MG tablet Take 1 tablet (1 mg) by mouth daily.  Qty: 30 tablet, Refills: 0    Associated Diagnoses: Alcohol use disorder      ibuprofen (ADVIL/MOTRIN) 400 MG tablet Take 1 tablet (400 mg) by mouth every 6 hours as needed for moderate pain.    Associated Diagnoses: Alcohol-induced acute pancreatitis, unspecified complication status      metoprolol succinate ER (TOPROL XL) 50 MG 24 hr tablet Take 1 tablet (50 mg) by mouth daily.  Qty: 30 tablet, Refills: 1    Associated Diagnoses: Primary hypertension      multivitamin w/minerals (THERA-VIT-M) tablet Take 1 tablet by mouth daily.  Qty: 90 tablet, Refills: 0    Associated Diagnoses: Alcohol use disorder      thiamine (B-1) 100 MG tablet Take 1 tablet (100 mg) by mouth daily.  Qty: 30 tablet,  Refills: 0    Associated Diagnoses: Alcohol use disorder           CONTINUE these medications which have NOT CHANGED    Details   alum & mag hydroxide-simethicone (MAALOX MAX) 400-400-40 MG/5ML SUSP suspension Take 30 mLs by mouth every 4 hours as needed for indigestion.  Qty: 355 mL, Refills: 0      escitalopram (LEXAPRO) 20 MG tablet Take 20 mg by mouth daily.      omeprazole (PRILOSEC OTC) 20 MG EC tablet Take 20 mg by mouth daily.                   Rationale for medication changes:      Folic acid and thiamine supplement  Metoprolol XL 50 mg daily for high blood pressure        Consults   Chemical dependency  Interventional radiology    Immunizations given this encounter     Most Recent Immunizations   Administered Date(s) Administered    Flu, Unspecified 10/07/2010    HepB, Unspecified 07/08/2008    MMR 08/19/2002    TDAP (Adacel,Boostrix) 07/08/2008    Td,adult,historic,unspecified 07/08/2008           Anticoagulation Information      None      SIGNIFICANT IMAGING FINDINGS     Results for orders placed or performed during the hospital encounter of 01/15/25   CT Abdomen Pelvis w/o Contrast    Impression    IMPRESSION:   1. Acute pancreatitis: There is a moderate to extensive amount of peripancreatic stranding/edema.  2. No other cause of acute pain identified in the abdomen or pelvis.   3. 3 cm soft tissue nodule in the right side of the retroperitoneum at the level of the kidneys, significantly increased in size since studies dating back to 10/24/2021. This most likely represents an enlarged lymph node. There are a few additional   borderline and mildly enlarged lymph nodes in this region that are also more prominent. These are nonspecific, but suspicious for neoplasm. The larger nodule is likely amenable to percutaneous imaging-guided biopsy.   US Abdomen Limited    Impression    IMPRESSION:  1. Hepatic steatosis.  2. Normal gallbladder without cholelithiasis.  3. Findings of pancreatitis and retrocaval  adenopathy are better evaluated on same-day CT.       SIGNIFICANT LABORATORY FINDINGS     Creatinine 2.5-->0.70  1/19-->AST 72, ALT 95 bilirubin 1.9  Hemoglobin A1c 6  Lipase 2078-->115  WBC 7.8, hemoglobin 11.6, platelet 93  Discharge Orders        MR Abdomen w Contrast     IR Referral     Reason for your hospital stay    Alcohol intoxication, alcohol withdrawal seizure, abdominal pain     Follow-up and recommended labs and tests     Follow-up with primary MD in 1 week  Absolute alcohol cessation  Complete an intensive outpatient CD treatment program.  Abdomen MRI in a week  Retroperitoneal lymph node biopsy as needed  CBC, CMP in 1 week     Activity    Your activity upon discharge: activity as tolerated     Diet    Follow this diet upon discharge: Low salt diet. Low fat diet       Examination   Physical Exam   Temp:  [97.9  F (36.6  C)-98.5  F (36.9  C)] 98.2  F (36.8  C)  Pulse:  [70-77] 74  Resp:  [16-18] 16  BP: (136-176)/() 176/113  SpO2:  [95 %-97 %] 97 %  Wt Readings from Last 1 Encounters:   01/18/25 79.6 kg (175 lb 7.8 oz)     GENERAL:  Alert, appears comfortable, in no acute distress, appears stated age   HEAD:  Normocephalic, without obvious abnormality, atraumatic   EYES:  PERRL, conjunctiva  clear,  EOM's intact   NOSE: Nares normal,  mucosa normal, no drainage   THROAT: Lips, mucosa,  gums normal, mouth moist   NECK: Supple, symmetrical, trachea midline   BACK:   Symmetric, no curvature, ROM normal   LUNGS:   Clear to auscultation bilaterally, no rales, rhonchi, or wheezing, symmetric chest rise on inhalation, respirations unlabored   CHEST WALL:  No tenderness or deformity   HEART:  Regular rate and rhythm, S1 and S2 normal, no murmur    ABDOMEN:   Soft, non-tender, bowel sounds active , no masses, no organomegaly, no rebound or guarding   EXTREMITIES: No edema    SKIN: No rashes in the visualized areas   NEURO: Alert, oriented x3, moves all four extremities freely   PSYCH: Cooperative,  behavior is appropriate        Please see EMR for more detailed significant labs, imaging, consultant notes etc.    ISoledad MD, personally saw the patient today and spent greater than 30 minutes discharging this patient.    Soledad Gibbons MD  [facility]    CC:Taz Lewis

## 2025-01-19 NOTE — PLAN OF CARE
Goal Outcome Evaluation:    Problem: Adult Inpatient Plan of Care  Goal: Plan of Care Review  Description: The Plan of Care Review/Shift note should be completed every shift.  The Outcome Evaluation is a brief statement about your assessment that the patient is improving, declining, or no change.  This information will be displayed automatically on your shift  note.  Outcome: Progressing  Flowsheets (Taken 1/18/2025 4375)  Plan of Care Reviewed With: patient  Overall Patient Progress: improving    VSS on RA. Pt is alert and oriented. CIWA not scoring. Calls appropriately for needs. IND.  Pt tolerating regular diet. IV saline locked. Voids spontaneously. Pain controlled with oral medications. K, Mg, and Phos protocols recheck in AM.

## 2025-01-19 NOTE — PLAN OF CARE
Goal Outcome Evaluation:    Problem: Adult Inpatient Plan of Care  Goal: Plan of Care Review  Description: The Plan of Care Review/Shift note should be completed every shift.  The Outcome Evaluation is a brief statement about your assessment that the patient is improving, declining, or no change.  This information will be displayed automatically on your shift  note.  Outcome: Progressing  Flowsheets (Taken 1/19/2025 1435)  Plan of Care Reviewed With: patient  Overall Patient Progress: improving   VSS on RA. Pt is alert and oriented. Calls appropriately for needs. IND.  Pt tolerating regular diet. IV removed. Voids spontaneously. Pain controlled with oral medications. Alarms on. K, Mg, and Phos protocols recheck in AM.

## 2025-01-19 NOTE — PLAN OF CARE
Problem: Adult Inpatient Plan of Care  Goal: Optimal Comfort and Wellbeing  Outcome: Progressing  Intervention: Monitor Pain and Promote Comfort   Goal Outcome Evaluation:       Pt is alert and oriented X 4. VSS except elevated BP. Complains of abdominal pain, PRN Oxycodone and Tylenol given. CIWA score 0. K, Mag, and Ph protocols. Calls appropriately. Ambulates independently in room.

## 2025-01-20 NOTE — PROGRESS NOTES
Discharge note    Belongings returned   AVS reviewed with patient  PIV out    Transported via staff in wheelchair to private vehicle

## 2025-01-22 ENCOUNTER — TELEPHONE (OUTPATIENT)
Dept: CARE COORDINATION | Facility: CLINIC | Age: 50
End: 2025-01-22
Payer: COMMERCIAL

## 2025-01-22 NOTE — TELEPHONE ENCOUNTER
Care Coordination: 1/22/2025    Support Call offering an in clinic primary care follow up appointment after being discharged from the hospital. The patient reported that he is doing and felling much better and javier's not feel that he needs additional treatment at this time.         Cricket Serna Sr.   Care Coordination  [address] [address]  [email or web address]   Office: [phone] Direct: [phone]  [facility]

## 2025-01-28 ENCOUNTER — HOSPITAL ENCOUNTER (OUTPATIENT)
Dept: MRI IMAGING | Facility: CLINIC | Age: 50
Discharge: HOME | End: 2025-01-28
Attending: FAMILY MEDICINE
Payer: COMMERCIAL

## 2025-01-28 DIAGNOSIS — R59.1 LYMPHADENOPATHY: ICD-10-CM

## 2025-01-28 PROCEDURE — A9585 GADOBUTROL INJECTION: HCPCS | Performed by: FAMILY MEDICINE

## 2025-01-28 PROCEDURE — 255N000002 HC RX 255 OP 636: Performed by: FAMILY MEDICINE

## 2025-01-28 PROCEDURE — 74183 MRI ABD W/O CNTR FLWD CNTR: CPT

## 2025-01-28 RX ORDER — GADOBUTROL 604.72 MG/ML
0.1 INJECTION INTRAVENOUS ONCE
Status: COMPLETED | OUTPATIENT
Start: 2025-01-28 | End: 2025-01-28

## 2025-01-28 RX ADMIN — GADOBUTROL 8 ML: 604.72 INJECTION INTRAVENOUS at 09:21

## 2025-02-14 ENCOUNTER — TELEPHONE (OUTPATIENT)
Dept: INTERVENTIONAL RADIOLOGY/VASCULAR | Facility: CLINIC | Age: 50
End: 2025-02-14
Payer: COMMERCIAL

## 2025-02-18 ENCOUNTER — TELEPHONE (OUTPATIENT)
Dept: INTERNAL MEDICINE | Facility: CLINIC | Age: 50
End: 2025-02-18
Payer: COMMERCIAL

## 2025-02-18 NOTE — TELEPHONE ENCOUNTER
General Call    Contacts       Contact Date/Time Type Contact Phone/Fax    02/18/2025 08:07 AM CST Phone (Incoming) Pre-registration-[facility] [phone]          Reason for Call:  CT biopsy additional information for referral    What are your questions or concerns:  Pre reg is calling and states that [facility] needs additional information prior to the biopsy tomorrow, 2/19/25.     Please advise

## 2025-02-18 NOTE — TELEPHONE ENCOUNTER
Referral is not required since CT is being done at a  Facility.    Carmela  [facility]

## 2025-02-19 ENCOUNTER — HOSPITAL ENCOUNTER (OUTPATIENT)
Dept: CT IMAGING | Facility: CLINIC | Age: 50
Discharge: HOME | End: 2025-02-19
Attending: FAMILY MEDICINE
Payer: COMMERCIAL

## 2025-02-19 VITALS
RESPIRATION RATE: 20 BRPM | DIASTOLIC BLOOD PRESSURE: 82 MMHG | SYSTOLIC BLOOD PRESSURE: 126 MMHG | TEMPERATURE: 97.8 F | OXYGEN SATURATION: 97 % | HEART RATE: 44 BPM

## 2025-02-19 DIAGNOSIS — Z98.890 HISTORY OF LYMPH NODE BIOPSY: ICD-10-CM

## 2025-02-19 DIAGNOSIS — R59.1 LYMPHADENOPATHY: Primary | ICD-10-CM

## 2025-02-19 LAB
APTT PPP: 27 SECONDS (ref 22–38)
INR PPP: 1.05 (ref 0.85–1.15)
PLATELET # BLD AUTO: 299 10E3/UL (ref 150–450)

## 2025-02-19 PROCEDURE — 77012 CT SCAN FOR NEEDLE BIOPSY: CPT

## 2025-02-19 PROCEDURE — 85049 AUTOMATED PLATELET COUNT: CPT | Performed by: STUDENT IN AN ORGANIZED HEALTH CARE EDUCATION/TRAINING PROGRAM

## 2025-02-19 PROCEDURE — 85610 PROTHROMBIN TIME: CPT | Performed by: STUDENT IN AN ORGANIZED HEALTH CARE EDUCATION/TRAINING PROGRAM

## 2025-02-19 PROCEDURE — 88184 FLOWCYTOMETRY/ TC 1 MARKER: CPT | Performed by: FAMILY MEDICINE

## 2025-02-19 PROCEDURE — 250N000011 HC RX IP 250 OP 636: Performed by: STUDENT IN AN ORGANIZED HEALTH CARE EDUCATION/TRAINING PROGRAM

## 2025-02-19 PROCEDURE — 85730 THROMBOPLASTIN TIME PARTIAL: CPT | Performed by: STUDENT IN AN ORGANIZED HEALTH CARE EDUCATION/TRAINING PROGRAM

## 2025-02-19 PROCEDURE — 36415 COLL VENOUS BLD VENIPUNCTURE: CPT | Performed by: STUDENT IN AN ORGANIZED HEALTH CARE EDUCATION/TRAINING PROGRAM

## 2025-02-19 RX ORDER — NALOXONE HYDROCHLORIDE 0.4 MG/ML
0.2 INJECTION, SOLUTION INTRAMUSCULAR; INTRAVENOUS; SUBCUTANEOUS
Status: DISCONTINUED | OUTPATIENT
Start: 2025-02-19 | End: 2025-02-20 | Stop reason: HOSPADM

## 2025-02-19 RX ORDER — FLUMAZENIL 0.1 MG/ML
0.2 INJECTION, SOLUTION INTRAVENOUS
Status: DISCONTINUED | OUTPATIENT
Start: 2025-02-19 | End: 2025-02-20 | Stop reason: HOSPADM

## 2025-02-19 RX ORDER — NALOXONE HYDROCHLORIDE 0.4 MG/ML
0.4 INJECTION, SOLUTION INTRAMUSCULAR; INTRAVENOUS; SUBCUTANEOUS
Status: DISCONTINUED | OUTPATIENT
Start: 2025-02-19 | End: 2025-02-20 | Stop reason: HOSPADM

## 2025-02-19 RX ORDER — FENTANYL CITRATE 50 UG/ML
25-50 INJECTION, SOLUTION INTRAMUSCULAR; INTRAVENOUS EVERY 5 MIN PRN
Status: DISCONTINUED | OUTPATIENT
Start: 2025-02-19 | End: 2025-02-20 | Stop reason: HOSPADM

## 2025-02-19 RX ADMIN — MIDAZOLAM HYDROCHLORIDE 1 MG: 1 INJECTION, SOLUTION INTRAMUSCULAR; INTRAVENOUS at 08:23

## 2025-02-19 RX ADMIN — MIDAZOLAM HYDROCHLORIDE 0.5 MG: 1 INJECTION, SOLUTION INTRAMUSCULAR; INTRAVENOUS at 08:32

## 2025-02-19 RX ADMIN — FENTANYL CITRATE 25 MCG: 50 INJECTION INTRAMUSCULAR; INTRAVENOUS at 08:32

## 2025-02-19 RX ADMIN — FENTANYL CITRATE 50 MCG: 50 INJECTION INTRAMUSCULAR; INTRAVENOUS at 08:24

## 2025-02-19 NOTE — PROCEDURES
[facility]    Procedure: CT guided retroperitoneal lymph node biopsy with moderate sedation    Date/Time: 2/19/2025 8:47 AM    Performed by: Willi Mazariegos MD  Authorized by: Willi Mazariegos MD  IR Fellow Physician:    Pre Procedure Diagnosis: Retroperitoneal lymphadenopathy, no known primary  Post Procedure Diagnosis: Same    UNIVERSAL PROTOCOL   Site Marked: Yes  Prior Images Obtained and Reviewed:  Yes  Required items: Required blood products, implants, devices and special equipment available    Patient identity confirmed:  Verbally with patient, arm band and provided demographic data  Patient was reevaluated immediately before administering moderate or deep sedation or anesthesia  Confirmation Checklist:  Patient's identity using two indicators, relevant allergies and procedure was appropriate and matched the consent or emergent situation  Time out: Immediately prior to the procedure a time out was called    Universal Protocol: the Joint Commission Universal Protocol was followed    Preparation: Patient was prepped and draped in usual sterile fashion       ANESTHESIA    Anesthesia:  Local infiltration  Local Anesthetic:  Lidocaine 1% without epinephrine      SEDATION  Patient Sedated: Yes    Sedation Type:  Moderate (conscious) sedation  Sedation:  Fentanyl, midazolam and see MAR for details  Vital signs: Vital signs monitored during sedation    Findings: Successful core biopsy of retrocaval node, 4 cores obtained (1 for flow cytometry)    Specimens: core needle biopsy specimens sent for pathological analysis    Procedural Complications: None    Condition: Stable    Plan: Bedrest 1 hour.       PROCEDURE    Length of time physician/provider present for 1:1 monitoring during sedation:  0-22 min

## 2025-02-19 NOTE — PROGRESS NOTES
Patient Name: Luiz Herbert  Medical Record Number: [id number]  Today's Date: 2/19/2025    Procedure: Computed tomography lymph node biospy with moderate sedation   Proceduralist: Dr. Mazariegos    Procedure Start: 0825  Procedure end: 0845  Sedation medications administered: 1.5 mg midazolam and 75 mcg fentanyl   Sedation time: 20 minutes    Report given to: N/A  : N/A    Other Notes: Pt arrived to IR room 1 from Pre/post bay 1. Consent reviewed. Pt denies any questions or concerns regarding procedure. Pt positioned supine and monitored per protocol. Pt tolerated procedure without any noted complications. VSS on monitor. Pt transferred back to Pre/post bay 1. Discharge instructions explained to patient.  Patient escorted via wheelchair to car, friend Melvin to transport.       FYI message left for PCP team, patient's heart rate was lows 40s prior to procedure.  Patient just started on metoprolol and states that he doesn't take it every day, but his heart rate was much higher while he was still drinking.  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  Instructed clinic to call patient back with instructions.

## 2025-02-19 NOTE — PRE-PROCEDURE
GENERAL PRE-PROCEDURE:   Procedure:  CT guided retroperitoneal lymph node biopsy with moderate sedation  Date/Time:  2/19/2025 7:43 AM    Written consent obtained?: Yes    Risks and benefits: Risks, benefits and alternatives were discussed    Consent given by:  Patient  Patient states understanding of procedure being performed: Yes    Patient's understanding of procedure matches consent: Yes    Procedure consent matches procedure scheduled: Yes    Expected level of sedation:  Moderate  Appropriately NPO:  Yes  ASA Class:  1  Mallampati  :  Grade 1- soft palate, uvula, tonsillar pillars, and posterior pharyngeal wall visible  Lungs:  Lungs clear with good breath sounds bilaterally  Heart:  Normal heart sounds and rate  History & Physical reviewed:  History and physical reviewed and no updates needed  Statement of review:  I have reviewed the lab findings, diagnostic data, medications, and the plan for sedation

## 2025-02-19 NOTE — DISCHARGE INSTRUCTIONS
1. You are required to have someone accompany you home. Do not drive or operate machinery today as the medication may cause sleepiness.    2. Rest today and avoid strenuous activity or heavy lifting for 48 hours. Over-activity may produce dizziness and or nausea.    3. You should follow your normal diet. Drink plenty of fluids. No alcoholic beverages for 24 hours. *(Alcohol may interact with the medications you received today)    4. Leave bandage on today, you may remove tomorrow.    5. You may shower tomorrow. Do not soak in a bath tub, hot tub, or swim until the site is completely healed and the skin glue is off. Keep the site clean and dry.    ADDITIONAL INSTRUCTIONS    When to call your Doctor:     1. Watch your biopsy site for signs of infection, increase pain, redness, swelling, or any drainage and or fever or chills.    2. On-going nausea, vomiting, or un-usual increase in pain.    3. If you experience any of the above or sudden weakness, dizziness, abdominal pain, flank pain or a temperature above 100.0 degree F for more than 24 hours, call your Doctor.    4. Sudden on-set of shortness of breath - call 911 or go to the emergency room.           * Recovery After Conscious Sedation (Adult)  We gave you medicine by vein to make you sleepy or relaxed during your procedure. This may have included both a pain medicine and sleeping medicine. Most of the effects have worn off. But you may still feel sleepy for the next 6 to 8 hours.  Home care  Follow these guidelines when you get home:  You may feel sleepy and clumsy and have poor balance for the next few hours.  A responsible adult should stay with you for the next 8 hours. This person should make sure your condition doesn t get worse.  Don't drink any alcohol for the next 24 hours.  Don't drive, operate dangerous machinery, make important business or personal decisions or sign legal documents during the next 24 hours.  You may vomit (throw up) if you eat too soon  after the procedure. If this happens, drink small amounts of water, juice or clear broth. Wait to try solid food until you no longer have nausea (upset stomach).  Note: Your care team may tell you not to take any medicine by mouth for pain or sleep in the next 4 hours. These medicines may react with the medicines you had in the hospital. This could cause a much stronger response than usual.  Follow-up care  Follow up with your care team if you are not alert and back to your usual level of activity within 12 hours.  When to seek medical advice  Call your care team right away if any of these occur:  You still feel sleepy or clumsy after 12 hours, or your sleepiness gets worse  Weakness or dizziness gets worse  Repeated vomiting  If you can't be woken up and someone is staying with you, they should call 911.  For informational purposes only. Not to replace the advice of your health care provider.  Copyright   2018 [employer]. All rights reserved.

## 2025-02-20 LAB
PATH REPORT.COMMENTS IMP SPEC: NORMAL
PATH REPORT.FINAL DX SPEC: NORMAL
PATH REPORT.MICROSCOPIC SPEC OTHER STN: NORMAL
PATH REPORT.RELEVANT HX SPEC: NORMAL

## 2025-02-23 LAB
PATH REPORT.COMMENTS IMP SPEC: ABNORMAL
PATH REPORT.COMMENTS IMP SPEC: YES
PATH REPORT.FINAL DX SPEC: ABNORMAL
PATH REPORT.GROSS SPEC: ABNORMAL
PATH REPORT.MICROSCOPIC SPEC OTHER STN: ABNORMAL
PATH REPORT.RELEVANT HX SPEC: ABNORMAL
PHOTO IMAGE: ABNORMAL

## (undated) RX ORDER — FENTANYL CITRATE 50 UG/ML
INJECTION, SOLUTION INTRAMUSCULAR; INTRAVENOUS
Status: DISPENSED
Start: 2025-02-19